# Patient Record
Sex: FEMALE | Race: OTHER | HISPANIC OR LATINO
[De-identification: names, ages, dates, MRNs, and addresses within clinical notes are randomized per-mention and may not be internally consistent; named-entity substitution may affect disease eponyms.]

---

## 2022-11-07 ENCOUNTER — NON-APPOINTMENT (OUTPATIENT)
Age: 27
End: 2022-11-07

## 2022-11-07 ENCOUNTER — APPOINTMENT (OUTPATIENT)
Dept: OBGYN | Facility: CLINIC | Age: 27
End: 2022-11-07

## 2022-11-07 VITALS
HEIGHT: 64 IN | BODY MASS INDEX: 33.29 KG/M2 | WEIGHT: 195 LBS | SYSTOLIC BLOOD PRESSURE: 146 MMHG | DIASTOLIC BLOOD PRESSURE: 85 MMHG | OXYGEN SATURATION: 98 %

## 2022-11-07 DIAGNOSIS — Z83.79 FAMILY HISTORY OF OTHER DISEASES OF THE DIGESTIVE SYSTEM: ICD-10-CM

## 2022-11-07 DIAGNOSIS — Z78.9 OTHER SPECIFIED HEALTH STATUS: ICD-10-CM

## 2022-11-07 DIAGNOSIS — E73.9 LACTOSE INTOLERANCE, UNSPECIFIED: ICD-10-CM

## 2022-11-07 DIAGNOSIS — Z87.19 PERSONAL HISTORY OF OTHER DISEASES OF THE DIGESTIVE SYSTEM: ICD-10-CM

## 2022-11-07 DIAGNOSIS — Z83.3 FAMILY HISTORY OF DIABETES MELLITUS: ICD-10-CM

## 2022-11-07 PROBLEM — Z00.00 ENCOUNTER FOR PREVENTIVE HEALTH EXAMINATION: Status: ACTIVE | Noted: 2022-11-07

## 2022-11-07 PROCEDURE — 76817 TRANSVAGINAL US OBSTETRIC: CPT

## 2022-11-07 PROCEDURE — 99204 OFFICE O/P NEW MOD 45 MIN: CPT | Mod: 25

## 2022-11-07 NOTE — PROCEDURE
[Transvaginal OB Sonogram] : Transvaginal OB Sonogram [Intrauterine Pregnancy] : intrauterine pregnancy [Yolk Sac] : yolk sac present [Fetal Heart] : no fetal heart [Transvaginal OB Sonogram WNL] : Transvaginal OB Sonogram WNL [FreeTextEntry1] : early iup at approx 5 weeks; + GS + YS; no fp just yet.\par

## 2022-11-07 NOTE — HISTORY OF PRESENT ILLNESS
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

## 2022-11-07 NOTE — PLAN
[FreeTextEntry1] : Review of prenatal care\par group practice reviewed\par screening genetic tests (option of basic genetic screen vs Horizon) will discuss at nv\par fetal screening reviewed (options of First screen only or add NIPTs) will discuss at nv\par questions answered\par emergency contact reviewed\par rtn 2 weeks for confirmtaion

## 2022-11-21 ENCOUNTER — APPOINTMENT (OUTPATIENT)
Dept: OBGYN | Facility: CLINIC | Age: 27
End: 2022-11-21

## 2022-11-21 ENCOUNTER — LABORATORY RESULT (OUTPATIENT)
Age: 27
End: 2022-11-21

## 2022-11-21 ENCOUNTER — TRANSCRIPTION ENCOUNTER (OUTPATIENT)
Age: 27
End: 2022-11-21

## 2022-11-21 VITALS
DIASTOLIC BLOOD PRESSURE: 63 MMHG | OXYGEN SATURATION: 98 % | HEART RATE: 83 BPM | SYSTOLIC BLOOD PRESSURE: 119 MMHG | BODY MASS INDEX: 33.82 KG/M2 | WEIGHT: 197 LBS

## 2022-11-21 DIAGNOSIS — N91.0 PRIMARY AMENORRHEA: ICD-10-CM

## 2022-11-21 DIAGNOSIS — Z13.79 ENCOUNTER FOR OTHER SCREENING FOR GENETIC AND CHROMOSOMAL ANOMALIES: ICD-10-CM

## 2022-11-21 DIAGNOSIS — Z11.3 ENCOUNTER FOR SCREENING FOR INFECTIONS WITH A PREDOMINANTLY SEXUAL MODE OF TRANSMISSION: ICD-10-CM

## 2022-11-21 PROCEDURE — 0502F SUBSEQUENT PRENATAL CARE: CPT

## 2022-11-28 ENCOUNTER — TRANSCRIPTION ENCOUNTER (OUTPATIENT)
Age: 27
End: 2022-11-28

## 2022-11-29 ENCOUNTER — TRANSCRIPTION ENCOUNTER (OUTPATIENT)
Age: 27
End: 2022-11-29

## 2022-11-30 LAB
ABO + RH PNL BLD: NORMAL
AR GENE MUT ANL BLD/T: NORMAL
BACTERIA UR CULT: NORMAL
BASOPHILS # BLD AUTO: 0.03 K/UL
BASOPHILS NFR BLD AUTO: 0.3 %
BLD GP AB SCN SERPL QL: NORMAL
C TRACH RRNA SPEC QL NAA+PROBE: NOT DETECTED
CMV IGG SERPL QL: 0.5 U/ML
CMV IGG SERPL-IMP: NEGATIVE
CMV IGM SERPL QL: <8 AU/ML
CMV IGM SERPL QL: NEGATIVE
EOSINOPHIL # BLD AUTO: 0.08 K/UL
EOSINOPHIL NFR BLD AUTO: 0.8 %
ESTIMATED AVERAGE GLUCOSE: 117 MG/DL
FMR1 GENE MUT ANL BLD/T: NORMAL
HBA1C MFR BLD HPLC: 5.7 %
HBV SURFACE AG SER QL: NONREACTIVE
HCT VFR BLD CALC: 37.4 %
HCV AB SER QL: NONREACTIVE
HCV RNA SERPL NAA+PROBE-LOG IU: NOT DETECTED LOGIU/ML
HCV S/CO RATIO: 0.12 S/CO
HEPC RNA INTERP: NOT DETECTED
HGB A MFR BLD: 97.7 %
HGB A2 MFR BLD: 2.3 %
HGB BLD-MCNC: 12.1 G/DL
HGB FRACT BLD-IMP: NORMAL
HIV1+2 AB SPEC QL IA.RAPID: NONREACTIVE
IMM GRANULOCYTES NFR BLD AUTO: 0.3 %
LYMPHOCYTES # BLD AUTO: 1.89 K/UL
LYMPHOCYTES NFR BLD AUTO: 17.8 %
MAN DIFF?: NORMAL
MCHC RBC-ENTMCNC: 28.9 PG
MCHC RBC-ENTMCNC: 32.4 GM/DL
MCV RBC AUTO: 89.5 FL
MEV IGG FLD QL IA: 149 AU/ML
MEV IGG+IGM SER-IMP: POSITIVE
MONOCYTES # BLD AUTO: 0.8 K/UL
MONOCYTES NFR BLD AUTO: 7.5 %
N GONORRHOEA RRNA SPEC QL NAA+PROBE: NOT DETECTED
NEUTROPHILS # BLD AUTO: 7.79 K/UL
NEUTROPHILS NFR BLD AUTO: 73.3 %
PLATELET # BLD AUTO: 287 K/UL
RBC # BLD: 4.18 M/UL
RBC # FLD: 12.6 %
RUBV IGG FLD-ACNC: 2 INDEX
RUBV IGG SER-IMP: POSITIVE
SOURCE AMPLIFICATION: NORMAL
T PALLIDUM AB SER QL IA: NEGATIVE
TSH SERPL-ACNC: 0.7 UIU/ML
VZV AB TITR SER: POSITIVE
VZV IGG SER IF-ACNC: 869.9 INDEX
WBC # FLD AUTO: 10.62 K/UL

## 2022-12-02 LAB — CFTR MUT TESTED BLD/T: NEGATIVE

## 2022-12-14 ENCOUNTER — NON-APPOINTMENT (OUTPATIENT)
Age: 27
End: 2022-12-14

## 2022-12-15 ENCOUNTER — APPOINTMENT (OUTPATIENT)
Dept: OBGYN | Facility: CLINIC | Age: 27
End: 2022-12-15

## 2022-12-15 VITALS
BODY MASS INDEX: 32.78 KG/M2 | HEIGHT: 64 IN | OXYGEN SATURATION: 98 % | WEIGHT: 192 LBS | DIASTOLIC BLOOD PRESSURE: 84 MMHG | SYSTOLIC BLOOD PRESSURE: 130 MMHG

## 2022-12-15 DIAGNOSIS — Z23 ENCOUNTER FOR IMMUNIZATION: ICD-10-CM

## 2022-12-15 PROCEDURE — G0008: CPT

## 2022-12-15 PROCEDURE — 90686 IIV4 VACC NO PRSV 0.5 ML IM: CPT

## 2022-12-15 PROCEDURE — 0502F SUBSEQUENT PRENATAL CARE: CPT

## 2022-12-27 ENCOUNTER — ASOB RESULT (OUTPATIENT)
Age: 27
End: 2022-12-27

## 2022-12-27 ENCOUNTER — APPOINTMENT (OUTPATIENT)
Dept: ANTEPARTUM | Facility: CLINIC | Age: 27
End: 2022-12-27

## 2022-12-27 PROCEDURE — 76801 OB US < 14 WKS SINGLE FETUS: CPT

## 2022-12-27 PROCEDURE — 36415 COLL VENOUS BLD VENIPUNCTURE: CPT

## 2022-12-27 PROCEDURE — 93976 VASCULAR STUDY: CPT

## 2022-12-27 PROCEDURE — 76813 OB US NUCHAL MEAS 1 GEST: CPT

## 2022-12-29 ENCOUNTER — TRANSCRIPTION ENCOUNTER (OUTPATIENT)
Age: 27
End: 2022-12-29

## 2023-01-02 ENCOUNTER — TRANSCRIPTION ENCOUNTER (OUTPATIENT)
Age: 28
End: 2023-01-02

## 2023-01-06 ENCOUNTER — TRANSCRIPTION ENCOUNTER (OUTPATIENT)
Age: 28
End: 2023-01-06

## 2023-01-06 ENCOUNTER — NON-APPOINTMENT (OUTPATIENT)
Age: 28
End: 2023-01-06

## 2023-01-20 ENCOUNTER — APPOINTMENT (OUTPATIENT)
Dept: OBGYN | Facility: CLINIC | Age: 28
End: 2023-01-20
Payer: COMMERCIAL

## 2023-01-20 VITALS
HEIGHT: 64 IN | OXYGEN SATURATION: 98 % | WEIGHT: 189 LBS | HEART RATE: 78 BPM | BODY MASS INDEX: 32.27 KG/M2 | SYSTOLIC BLOOD PRESSURE: 126 MMHG | DIASTOLIC BLOOD PRESSURE: 80 MMHG

## 2023-01-20 DIAGNOSIS — Z34.90 ENCOUNTER FOR SUPERVISION OF NORMAL PREGNANCY, UNSPECIFIED, UNSPECIFIED TRIMESTER: ICD-10-CM

## 2023-01-20 PROCEDURE — 0502F SUBSEQUENT PRENATAL CARE: CPT

## 2023-01-20 RX ORDER — ASPIRIN 81 MG/1
81 TABLET, CHEWABLE ORAL
Refills: 0 | Status: ACTIVE | COMMUNITY
Start: 2023-01-20

## 2023-02-17 ENCOUNTER — APPOINTMENT (OUTPATIENT)
Dept: ANTEPARTUM | Facility: CLINIC | Age: 28
End: 2023-02-17
Payer: COMMERCIAL

## 2023-02-17 ENCOUNTER — ASOB RESULT (OUTPATIENT)
Age: 28
End: 2023-02-17

## 2023-02-17 PROCEDURE — 76817 TRANSVAGINAL US OBSTETRIC: CPT

## 2023-02-17 PROCEDURE — 76811 OB US DETAILED SNGL FETUS: CPT

## 2023-02-22 ENCOUNTER — NON-APPOINTMENT (OUTPATIENT)
Age: 28
End: 2023-02-22

## 2023-02-22 LAB
AFP MOM: 0.72
AFP VALUE: 21.7 NG/ML
ALPHA FETOPROTEIN SERUM COMMENT: NORMAL
ALPHA FETOPROTEIN SERUM INTERPRETATION: NORMAL
ALPHA FETOPROTEIN SERUM RESULTS: NORMAL
ALPHA FETOPROTEIN SERUM TEST RESULTS: NORMAL
GESTATIONAL AGE BASED ON: NORMAL
GESTATIONAL AGE ON COLLECTION DATE: 16 WEEKS
GLUCOSE 1H P 50 G GLC PO SERPL-MCNC: 116 MG/DL
INSULIN DEP DIABETES: NO
MATERNAL AGE AT EDD AFP: 29.6 YR
MULTIPLE GESTATION: NO
OSBR RISK 1 IN: NORMAL
RACE: NORMAL
WEIGHT AFP: 189 LBS

## 2023-02-23 ENCOUNTER — APPOINTMENT (OUTPATIENT)
Dept: OBGYN | Facility: CLINIC | Age: 28
End: 2023-02-23
Payer: COMMERCIAL

## 2023-02-23 VITALS
DIASTOLIC BLOOD PRESSURE: 82 MMHG | SYSTOLIC BLOOD PRESSURE: 124 MMHG | WEIGHT: 187 LBS | OXYGEN SATURATION: 98 % | HEART RATE: 82 BPM | HEIGHT: 64 IN | BODY MASS INDEX: 31.92 KG/M2

## 2023-02-23 PROCEDURE — 0502F SUBSEQUENT PRENATAL CARE: CPT

## 2023-03-03 ENCOUNTER — APPOINTMENT (OUTPATIENT)
Dept: ANTEPARTUM | Facility: CLINIC | Age: 28
End: 2023-03-03
Payer: COMMERCIAL

## 2023-03-03 ENCOUNTER — ASOB RESULT (OUTPATIENT)
Age: 28
End: 2023-03-03

## 2023-03-03 PROCEDURE — 76816 OB US FOLLOW-UP PER FETUS: CPT

## 2023-03-17 ENCOUNTER — APPOINTMENT (OUTPATIENT)
Dept: PEDIATRIC CARDIOLOGY | Facility: CLINIC | Age: 28
End: 2023-03-17
Payer: COMMERCIAL

## 2023-03-17 PROCEDURE — 76827 ECHO EXAM OF FETAL HEART: CPT

## 2023-03-17 PROCEDURE — 99203 OFFICE O/P NEW LOW 30 MIN: CPT | Mod: 25

## 2023-03-17 PROCEDURE — 93325 DOPPLER ECHO COLOR FLOW MAPG: CPT | Mod: 59

## 2023-03-17 PROCEDURE — 76825 ECHO EXAM OF FETAL HEART: CPT

## 2023-03-17 PROCEDURE — 76820 UMBILICAL ARTERY ECHO: CPT

## 2023-03-17 PROCEDURE — 76821 MIDDLE CEREBRAL ARTERY ECHO: CPT

## 2023-03-20 ENCOUNTER — NON-APPOINTMENT (OUTPATIENT)
Age: 28
End: 2023-03-20

## 2023-03-20 ENCOUNTER — APPOINTMENT (OUTPATIENT)
Dept: OBGYN | Facility: CLINIC | Age: 28
End: 2023-03-20
Payer: COMMERCIAL

## 2023-03-20 VITALS
OXYGEN SATURATION: 97 % | DIASTOLIC BLOOD PRESSURE: 81 MMHG | SYSTOLIC BLOOD PRESSURE: 113 MMHG | BODY MASS INDEX: 32.79 KG/M2 | WEIGHT: 191 LBS | HEART RATE: 104 BPM

## 2023-03-20 PROCEDURE — 0502F SUBSEQUENT PRENATAL CARE: CPT

## 2023-03-21 ENCOUNTER — TRANSCRIPTION ENCOUNTER (OUTPATIENT)
Age: 28
End: 2023-03-21

## 2023-03-21 LAB
BASOPHILS # BLD AUTO: 0.03 K/UL
BASOPHILS NFR BLD AUTO: 0.4 %
EOSINOPHIL # BLD AUTO: 0.06 K/UL
EOSINOPHIL NFR BLD AUTO: 0.8 %
GLUCOSE 1H P 50 G GLC PO SERPL-MCNC: 149 MG/DL
HCT VFR BLD CALC: 34.9 %
HGB BLD-MCNC: 11.4 G/DL
IMM GRANULOCYTES NFR BLD AUTO: 0.5 %
LYMPHOCYTES # BLD AUTO: 0.96 K/UL
LYMPHOCYTES NFR BLD AUTO: 12.5 %
MAN DIFF?: NORMAL
MCHC RBC-ENTMCNC: 28.8 PG
MCHC RBC-ENTMCNC: 32.7 GM/DL
MCV RBC AUTO: 88.1 FL
MONOCYTES # BLD AUTO: 0.63 K/UL
MONOCYTES NFR BLD AUTO: 8.2 %
NEUTROPHILS # BLD AUTO: 5.98 K/UL
NEUTROPHILS NFR BLD AUTO: 77.6 %
PLATELET # BLD AUTO: 231 K/UL
RBC # BLD: 3.96 M/UL
RBC # FLD: 12.9 %
T PALLIDUM AB SER QL IA: NEGATIVE
WBC # FLD AUTO: 7.7 K/UL

## 2023-03-27 ENCOUNTER — APPOINTMENT (OUTPATIENT)
Dept: OBGYN | Facility: CLINIC | Age: 28
End: 2023-03-27
Payer: COMMERCIAL

## 2023-03-27 DIAGNOSIS — O99.810 ABNORMAL GLUCOSE COMPLICATING PREGNANCY: ICD-10-CM

## 2023-03-27 PROCEDURE — 36415 COLL VENOUS BLD VENIPUNCTURE: CPT

## 2023-04-05 ENCOUNTER — TRANSCRIPTION ENCOUNTER (OUTPATIENT)
Age: 28
End: 2023-04-05

## 2023-04-10 ENCOUNTER — APPOINTMENT (OUTPATIENT)
Dept: OBGYN | Facility: CLINIC | Age: 28
End: 2023-04-10
Payer: COMMERCIAL

## 2023-04-10 VITALS
HEART RATE: 69 BPM | DIASTOLIC BLOOD PRESSURE: 79 MMHG | WEIGHT: 193 LBS | BODY MASS INDEX: 32.95 KG/M2 | SYSTOLIC BLOOD PRESSURE: 122 MMHG | HEIGHT: 64 IN | OXYGEN SATURATION: 98 %

## 2023-04-10 PROCEDURE — 90471 IMMUNIZATION ADMIN: CPT

## 2023-04-10 PROCEDURE — 0502F SUBSEQUENT PRENATAL CARE: CPT

## 2023-04-10 PROCEDURE — 90715 TDAP VACCINE 7 YRS/> IM: CPT

## 2023-04-24 ENCOUNTER — APPOINTMENT (OUTPATIENT)
Dept: ANTEPARTUM | Facility: CLINIC | Age: 28
End: 2023-04-24
Payer: COMMERCIAL

## 2023-04-24 ENCOUNTER — ASOB RESULT (OUTPATIENT)
Age: 28
End: 2023-04-24

## 2023-04-24 PROCEDURE — 76819 FETAL BIOPHYS PROFIL W/O NST: CPT

## 2023-04-24 PROCEDURE — 76816 OB US FOLLOW-UP PER FETUS: CPT

## 2023-05-03 ENCOUNTER — NON-APPOINTMENT (OUTPATIENT)
Age: 28
End: 2023-05-03

## 2023-05-03 ENCOUNTER — APPOINTMENT (OUTPATIENT)
Dept: OBGYN | Facility: CLINIC | Age: 28
End: 2023-05-03
Payer: COMMERCIAL

## 2023-05-03 VITALS
WEIGHT: 196 LBS | OXYGEN SATURATION: 97 % | DIASTOLIC BLOOD PRESSURE: 78 MMHG | SYSTOLIC BLOOD PRESSURE: 113 MMHG | HEART RATE: 92 BPM | BODY MASS INDEX: 33.64 KG/M2

## 2023-05-03 PROCEDURE — 0502F SUBSEQUENT PRENATAL CARE: CPT

## 2023-05-17 ENCOUNTER — APPOINTMENT (OUTPATIENT)
Dept: OBGYN | Facility: CLINIC | Age: 28
End: 2023-05-17
Payer: COMMERCIAL

## 2023-05-17 VITALS
HEIGHT: 64 IN | HEART RATE: 74 BPM | BODY MASS INDEX: 33.63 KG/M2 | WEIGHT: 197 LBS | OXYGEN SATURATION: 97 % | DIASTOLIC BLOOD PRESSURE: 77 MMHG | SYSTOLIC BLOOD PRESSURE: 114 MMHG

## 2023-05-17 PROCEDURE — 0502F SUBSEQUENT PRENATAL CARE: CPT

## 2023-05-31 ENCOUNTER — APPOINTMENT (OUTPATIENT)
Dept: OBGYN | Facility: CLINIC | Age: 28
End: 2023-05-31
Payer: COMMERCIAL

## 2023-05-31 VITALS
BODY MASS INDEX: 34.49 KG/M2 | SYSTOLIC BLOOD PRESSURE: 117 MMHG | OXYGEN SATURATION: 98 % | DIASTOLIC BLOOD PRESSURE: 76 MMHG | HEART RATE: 72 BPM | WEIGHT: 202 LBS | HEIGHT: 64 IN

## 2023-05-31 PROCEDURE — 0502F SUBSEQUENT PRENATAL CARE: CPT

## 2023-06-05 ENCOUNTER — APPOINTMENT (OUTPATIENT)
Dept: ANTEPARTUM | Facility: CLINIC | Age: 28
End: 2023-06-05
Payer: COMMERCIAL

## 2023-06-05 ENCOUNTER — ASOB RESULT (OUTPATIENT)
Age: 28
End: 2023-06-05

## 2023-06-05 PROCEDURE — 76816 OB US FOLLOW-UP PER FETUS: CPT

## 2023-06-05 PROCEDURE — 76819 FETAL BIOPHYS PROFIL W/O NST: CPT

## 2023-06-14 ENCOUNTER — APPOINTMENT (OUTPATIENT)
Dept: OBGYN | Facility: CLINIC | Age: 28
End: 2023-06-14
Payer: COMMERCIAL

## 2023-06-14 ENCOUNTER — LABORATORY RESULT (OUTPATIENT)
Age: 28
End: 2023-06-14

## 2023-06-14 VITALS
OXYGEN SATURATION: 97 % | DIASTOLIC BLOOD PRESSURE: 84 MMHG | HEART RATE: 70 BPM | SYSTOLIC BLOOD PRESSURE: 126 MMHG | WEIGHT: 203 LBS | BODY MASS INDEX: 34.85 KG/M2

## 2023-06-14 PROCEDURE — 0502F SUBSEQUENT PRENATAL CARE: CPT

## 2023-06-20 ENCOUNTER — APPOINTMENT (OUTPATIENT)
Dept: OBGYN | Facility: CLINIC | Age: 28
End: 2023-06-20
Payer: COMMERCIAL

## 2023-06-20 VITALS
BODY MASS INDEX: 34.66 KG/M2 | OXYGEN SATURATION: 97 % | HEIGHT: 64 IN | SYSTOLIC BLOOD PRESSURE: 132 MMHG | DIASTOLIC BLOOD PRESSURE: 88 MMHG | HEART RATE: 79 BPM | WEIGHT: 203 LBS

## 2023-06-20 VITALS — SYSTOLIC BLOOD PRESSURE: 122 MMHG | DIASTOLIC BLOOD PRESSURE: 84 MMHG

## 2023-06-20 PROCEDURE — 0502F SUBSEQUENT PRENATAL CARE: CPT

## 2023-06-21 ENCOUNTER — APPOINTMENT (OUTPATIENT)
Dept: ANTEPARTUM | Facility: CLINIC | Age: 28
End: 2023-06-21
Payer: COMMERCIAL

## 2023-06-21 ENCOUNTER — ASOB RESULT (OUTPATIENT)
Age: 28
End: 2023-06-21

## 2023-06-21 PROCEDURE — 76819 FETAL BIOPHYS PROFIL W/O NST: CPT

## 2023-06-21 PROCEDURE — 76816 OB US FOLLOW-UP PER FETUS: CPT

## 2023-06-27 LAB
B-HEM STREP SPEC QL CULT: NORMAL
HBV SURFACE AG SER QL: NONREACTIVE
HIV1+2 AB SPEC QL IA.RAPID: NONREACTIVE

## 2023-06-28 ENCOUNTER — APPOINTMENT (OUTPATIENT)
Dept: OBGYN | Facility: CLINIC | Age: 28
End: 2023-06-28
Payer: COMMERCIAL

## 2023-06-28 ENCOUNTER — INPATIENT (INPATIENT)
Facility: HOSPITAL | Age: 28
LOS: 2 days | Discharge: HOME | End: 2023-07-01
Attending: OBSTETRICS & GYNECOLOGY | Admitting: OBSTETRICS & GYNECOLOGY
Payer: COMMERCIAL

## 2023-06-28 VITALS — WEIGHT: 202.83 LBS | HEIGHT: 64 IN

## 2023-06-28 VITALS
DIASTOLIC BLOOD PRESSURE: 90 MMHG | HEIGHT: 64 IN | BODY MASS INDEX: 34.49 KG/M2 | SYSTOLIC BLOOD PRESSURE: 137 MMHG | OXYGEN SATURATION: 97 % | WEIGHT: 202 LBS | HEART RATE: 76 BPM

## 2023-06-28 DIAGNOSIS — Z3A.00 WEEKS OF GESTATION OF PREGNANCY NOT SPECIFIED: ICD-10-CM

## 2023-06-28 DIAGNOSIS — O26.899 OTHER SPECIFIED PREGNANCY RELATED CONDITIONS, UNSPECIFIED TRIMESTER: ICD-10-CM

## 2023-06-28 LAB
ALBUMIN SERPL ELPH-MCNC: 3.5 G/DL — SIGNIFICANT CHANGE UP (ref 3.3–5)
ALP SERPL-CCNC: 178 U/L — HIGH (ref 40–120)
ALT FLD-CCNC: 11 U/L — SIGNIFICANT CHANGE UP (ref 10–45)
ANION GAP SERPL CALC-SCNC: 11 MMOL/L — SIGNIFICANT CHANGE UP (ref 5–17)
APPEARANCE UR: CLEAR — SIGNIFICANT CHANGE UP
APTT BLD: 31.4 SEC — SIGNIFICANT CHANGE UP (ref 27.5–35.5)
AST SERPL-CCNC: 25 U/L — SIGNIFICANT CHANGE UP (ref 10–40)
BACTERIA # UR AUTO: PRESENT /HPF
BASOPHILS # BLD AUTO: 0.02 K/UL — SIGNIFICANT CHANGE UP (ref 0–0.2)
BASOPHILS NFR BLD AUTO: 0.2 % — SIGNIFICANT CHANGE UP (ref 0–2)
BILIRUB SERPL-MCNC: <0.2 MG/DL — SIGNIFICANT CHANGE UP (ref 0.2–1.2)
BILIRUB UR-MCNC: NEGATIVE — SIGNIFICANT CHANGE UP
BLD GP AB SCN SERPL QL: NEGATIVE — SIGNIFICANT CHANGE UP
BUN SERPL-MCNC: 8 MG/DL — SIGNIFICANT CHANGE UP (ref 7–23)
CALCIUM SERPL-MCNC: 9.5 MG/DL — SIGNIFICANT CHANGE UP (ref 8.4–10.5)
CHLORIDE SERPL-SCNC: 104 MMOL/L — SIGNIFICANT CHANGE UP (ref 96–108)
CO2 SERPL-SCNC: 21 MMOL/L — LOW (ref 22–31)
COLOR SPEC: YELLOW — SIGNIFICANT CHANGE UP
CREAT ?TM UR-MCNC: 32 MG/DL — SIGNIFICANT CHANGE UP
CREAT SERPL-MCNC: 0.51 MG/DL — SIGNIFICANT CHANGE UP (ref 0.5–1.3)
DIFF PNL FLD: NEGATIVE — SIGNIFICANT CHANGE UP
EGFR: 130 ML/MIN/1.73M2 — SIGNIFICANT CHANGE UP
EOSINOPHIL # BLD AUTO: 0.05 K/UL — SIGNIFICANT CHANGE UP (ref 0–0.5)
EOSINOPHIL NFR BLD AUTO: 0.6 % — SIGNIFICANT CHANGE UP (ref 0–6)
EPI CELLS # UR: SIGNIFICANT CHANGE UP /HPF (ref 0–5)
FIBRINOGEN PPP-MCNC: 559 MG/DL — HIGH (ref 200–445)
GLUCOSE SERPL-MCNC: 85 MG/DL — SIGNIFICANT CHANGE UP (ref 70–99)
GLUCOSE UR QL: NEGATIVE — SIGNIFICANT CHANGE UP
HCT VFR BLD CALC: 37.3 % — SIGNIFICANT CHANGE UP (ref 34.5–45)
HGB BLD-MCNC: 12.5 G/DL — SIGNIFICANT CHANGE UP (ref 11.5–15.5)
IMM GRANULOCYTES NFR BLD AUTO: 0.5 % — SIGNIFICANT CHANGE UP (ref 0–0.9)
INR BLD: 0.82 — LOW (ref 0.88–1.16)
KETONES UR-MCNC: NEGATIVE — SIGNIFICANT CHANGE UP
LDH SERPL L TO P-CCNC: 168 U/L — SIGNIFICANT CHANGE UP (ref 50–242)
LEUKOCYTE ESTERASE UR-ACNC: ABNORMAL
LYMPHOCYTES # BLD AUTO: 1.42 K/UL — SIGNIFICANT CHANGE UP (ref 1–3.3)
LYMPHOCYTES # BLD AUTO: 16 % — SIGNIFICANT CHANGE UP (ref 13–44)
MCHC RBC-ENTMCNC: 28.9 PG — SIGNIFICANT CHANGE UP (ref 27–34)
MCHC RBC-ENTMCNC: 33.5 GM/DL — SIGNIFICANT CHANGE UP (ref 32–36)
MCV RBC AUTO: 86.3 FL — SIGNIFICANT CHANGE UP (ref 80–100)
MONOCYTES # BLD AUTO: 0.55 K/UL — SIGNIFICANT CHANGE UP (ref 0–0.9)
MONOCYTES NFR BLD AUTO: 6.2 % — SIGNIFICANT CHANGE UP (ref 2–14)
NEUTROPHILS # BLD AUTO: 6.77 K/UL — SIGNIFICANT CHANGE UP (ref 1.8–7.4)
NEUTROPHILS NFR BLD AUTO: 76.5 % — SIGNIFICANT CHANGE UP (ref 43–77)
NITRITE UR-MCNC: NEGATIVE — SIGNIFICANT CHANGE UP
NRBC # BLD: 0 /100 WBCS — SIGNIFICANT CHANGE UP (ref 0–0)
PH UR: 6 — SIGNIFICANT CHANGE UP (ref 5–8)
PLATELET # BLD AUTO: 181 K/UL — SIGNIFICANT CHANGE UP (ref 150–400)
POTASSIUM SERPL-MCNC: 4.1 MMOL/L — SIGNIFICANT CHANGE UP (ref 3.5–5.3)
POTASSIUM SERPL-SCNC: 4.1 MMOL/L — SIGNIFICANT CHANGE UP (ref 3.5–5.3)
PROT ?TM UR-MCNC: 5 MG/DL — SIGNIFICANT CHANGE UP (ref 0–12)
PROT SERPL-MCNC: 6.7 G/DL — SIGNIFICANT CHANGE UP (ref 6–8.3)
PROT UR-MCNC: NEGATIVE MG/DL — SIGNIFICANT CHANGE UP
PROT/CREAT UR-RTO: 0.2 RATIO — SIGNIFICANT CHANGE UP (ref 0–0.2)
PROTHROM AB SERPL-ACNC: 9.7 SEC — LOW (ref 10.5–13.4)
RBC # BLD: 4.32 M/UL — SIGNIFICANT CHANGE UP (ref 3.8–5.2)
RBC # FLD: 13.9 % — SIGNIFICANT CHANGE UP (ref 10.3–14.5)
RBC CASTS # UR COMP ASSIST: < 5 /HPF — SIGNIFICANT CHANGE UP
RH IG SCN BLD-IMP: POSITIVE — SIGNIFICANT CHANGE UP
RH IG SCN BLD-IMP: POSITIVE — SIGNIFICANT CHANGE UP
SODIUM SERPL-SCNC: 136 MMOL/L — SIGNIFICANT CHANGE UP (ref 135–145)
SP GR SPEC: <=1.005 — SIGNIFICANT CHANGE UP (ref 1–1.03)
URATE SERPL-MCNC: 4 MG/DL — SIGNIFICANT CHANGE UP (ref 2.5–7)
UROBILINOGEN FLD QL: 0.2 E.U./DL — SIGNIFICANT CHANGE UP
WBC # BLD: 8.85 K/UL — SIGNIFICANT CHANGE UP (ref 3.8–10.5)
WBC # FLD AUTO: 8.85 K/UL — SIGNIFICANT CHANGE UP (ref 3.8–10.5)
WBC UR QL: ABNORMAL /HPF

## 2023-06-28 PROCEDURE — 0502F SUBSEQUENT PRENATAL CARE: CPT

## 2023-06-28 PROCEDURE — 59400 OBSTETRICAL CARE: CPT

## 2023-06-28 RX ORDER — CHLORHEXIDINE GLUCONATE 213 G/1000ML
1 SOLUTION TOPICAL ONCE
Refills: 0 | Status: COMPLETED | OUTPATIENT
Start: 2023-06-28 | End: 2023-06-28

## 2023-06-28 RX ORDER — SODIUM CHLORIDE 9 MG/ML
1000 INJECTION, SOLUTION INTRAVENOUS
Refills: 0 | Status: DISCONTINUED | OUTPATIENT
Start: 2023-06-28 | End: 2023-06-29

## 2023-06-28 RX ORDER — OXYTOCIN 10 UNIT/ML
333.33 VIAL (ML) INJECTION
Qty: 20 | Refills: 0 | Status: DISCONTINUED | OUTPATIENT
Start: 2023-06-28 | End: 2023-06-29

## 2023-06-28 RX ORDER — CITRIC ACID/SODIUM CITRATE 300-500 MG
15 SOLUTION, ORAL ORAL ONCE
Refills: 0 | Status: COMPLETED | OUTPATIENT
Start: 2023-06-28 | End: 2023-06-29

## 2023-06-28 RX ORDER — OXYTOCIN 10 UNIT/ML
1 VIAL (ML) INJECTION
Qty: 30 | Refills: 0 | Status: DISCONTINUED | OUTPATIENT
Start: 2023-06-28 | End: 2023-07-01

## 2023-06-28 RX ADMIN — Medication 1 MILLIUNIT(S)/MIN: at 20:33

## 2023-06-28 RX ADMIN — SODIUM CHLORIDE 125 MILLILITER(S): 9 INJECTION, SOLUTION INTRAVENOUS at 21:20

## 2023-06-28 RX ADMIN — SODIUM CHLORIDE 125 MILLILITER(S): 9 INJECTION, SOLUTION INTRAVENOUS at 16:01

## 2023-06-28 NOTE — PATIENT PROFILE OB - NSPRENATALGBS_OBGYN_ALL_OB_GET_DAYS
Unable to assess due to patient's baseline confusion secondary to dementia Unable to assess due to patient's baseline confusion secondary to underlying dementia and concurrent somnolence 14

## 2023-06-28 NOTE — PATIENT PROFILE OB - FALL HARM RISK - UNIVERSAL INTERVENTIONS
Bed in lowest position, wheels locked, appropriate side rails in place/Call bell, personal items and telephone in reach/Instruct patient to call for assistance before getting out of bed or chair/Non-slip footwear when patient is out of bed/[place] to call system/Physically safe environment - no spills, clutter or unnecessary equipment/Purposeful Proactive Rounding/Room/bathroom lighting operational, light cord in reach

## 2023-06-29 LAB
COVID-19 SPIKE DOMAIN AB INTERP: POSITIVE
COVID-19 SPIKE DOMAIN ANTIBODY RESULT: >250 U/ML — HIGH
SARS-COV-2 IGG+IGM SERPL QL IA: >250 U/ML — HIGH
SARS-COV-2 IGG+IGM SERPL QL IA: POSITIVE
T PALLIDUM AB TITR SER: NEGATIVE — SIGNIFICANT CHANGE UP

## 2023-06-29 PROCEDURE — 59400 OBSTETRICAL CARE: CPT

## 2023-06-29 RX ORDER — SIMETHICONE 80 MG/1
80 TABLET, CHEWABLE ORAL EVERY 4 HOURS
Refills: 0 | Status: DISCONTINUED | OUTPATIENT
Start: 2023-06-29 | End: 2023-07-01

## 2023-06-29 RX ORDER — TETANUS TOXOID, REDUCED DIPHTHERIA TOXOID AND ACELLULAR PERTUSSIS VACCINE, ADSORBED 5; 2.5; 8; 8; 2.5 [IU]/.5ML; [IU]/.5ML; UG/.5ML; UG/.5ML; UG/.5ML
0.5 SUSPENSION INTRAMUSCULAR ONCE
Refills: 0 | Status: DISCONTINUED | OUTPATIENT
Start: 2023-06-29 | End: 2023-07-01

## 2023-06-29 RX ORDER — OXYTOCIN 10 UNIT/ML
41.67 VIAL (ML) INJECTION
Qty: 20 | Refills: 0 | Status: DISCONTINUED | OUTPATIENT
Start: 2023-06-29 | End: 2023-07-01

## 2023-06-29 RX ORDER — OXYCODONE HYDROCHLORIDE 5 MG/1
5 TABLET ORAL
Refills: 0 | Status: DISCONTINUED | OUTPATIENT
Start: 2023-06-29 | End: 2023-07-01

## 2023-06-29 RX ORDER — DIBUCAINE 1 %
1 OINTMENT (GRAM) RECTAL EVERY 6 HOURS
Refills: 0 | Status: DISCONTINUED | OUTPATIENT
Start: 2023-06-29 | End: 2023-07-01

## 2023-06-29 RX ORDER — BENZOCAINE 10 %
1 GEL (GRAM) MUCOUS MEMBRANE EVERY 6 HOURS
Refills: 0 | Status: DISCONTINUED | OUTPATIENT
Start: 2023-06-29 | End: 2023-07-01

## 2023-06-29 RX ORDER — IBUPROFEN 200 MG
600 TABLET ORAL EVERY 6 HOURS
Refills: 0 | Status: COMPLETED | OUTPATIENT
Start: 2023-06-29 | End: 2024-05-27

## 2023-06-29 RX ORDER — LANOLIN
1 OINTMENT (GRAM) TOPICAL EVERY 6 HOURS
Refills: 0 | Status: DISCONTINUED | OUTPATIENT
Start: 2023-06-29 | End: 2023-07-01

## 2023-06-29 RX ORDER — DIPHENHYDRAMINE HCL 50 MG
25 CAPSULE ORAL EVERY 6 HOURS
Refills: 0 | Status: DISCONTINUED | OUTPATIENT
Start: 2023-06-29 | End: 2023-07-01

## 2023-06-29 RX ORDER — ACETAMINOPHEN 500 MG
975 TABLET ORAL
Refills: 0 | Status: DISCONTINUED | OUTPATIENT
Start: 2023-06-29 | End: 2023-07-01

## 2023-06-29 RX ORDER — SODIUM CHLORIDE 9 MG/ML
3 INJECTION INTRAMUSCULAR; INTRAVENOUS; SUBCUTANEOUS EVERY 8 HOURS
Refills: 0 | Status: DISCONTINUED | OUTPATIENT
Start: 2023-06-29 | End: 2023-07-01

## 2023-06-29 RX ORDER — HYDROCORTISONE 1 %
1 OINTMENT (GRAM) TOPICAL EVERY 6 HOURS
Refills: 0 | Status: DISCONTINUED | OUTPATIENT
Start: 2023-06-29 | End: 2023-07-01

## 2023-06-29 RX ORDER — SODIUM CHLORIDE 9 MG/ML
500 INJECTION, SOLUTION INTRAVENOUS ONCE
Refills: 0 | Status: DISCONTINUED | OUTPATIENT
Start: 2023-06-29 | End: 2023-07-01

## 2023-06-29 RX ORDER — MAGNESIUM HYDROXIDE 400 MG/1
30 TABLET, CHEWABLE ORAL
Refills: 0 | Status: DISCONTINUED | OUTPATIENT
Start: 2023-06-29 | End: 2023-07-01

## 2023-06-29 RX ORDER — FENTANYL/BUPIVACAINE/NS/PF 2MCG/ML-.1
250 PLASTIC BAG, INJECTION (ML) INJECTION
Refills: 0 | Status: DISCONTINUED | OUTPATIENT
Start: 2023-06-29 | End: 2023-07-01

## 2023-06-29 RX ORDER — PRAMOXINE HYDROCHLORIDE 150 MG/15G
1 AEROSOL, FOAM RECTAL EVERY 4 HOURS
Refills: 0 | Status: DISCONTINUED | OUTPATIENT
Start: 2023-06-29 | End: 2023-07-01

## 2023-06-29 RX ORDER — IBUPROFEN 200 MG
600 TABLET ORAL EVERY 6 HOURS
Refills: 0 | Status: DISCONTINUED | OUTPATIENT
Start: 2023-06-29 | End: 2023-07-01

## 2023-06-29 RX ORDER — AER TRAVELER 0.5 G/1
1 SOLUTION RECTAL; TOPICAL EVERY 4 HOURS
Refills: 0 | Status: DISCONTINUED | OUTPATIENT
Start: 2023-06-29 | End: 2023-07-01

## 2023-06-29 RX ORDER — KETOROLAC TROMETHAMINE 30 MG/ML
30 SYRINGE (ML) INJECTION ONCE
Refills: 0 | Status: DISCONTINUED | OUTPATIENT
Start: 2023-06-29 | End: 2023-06-29

## 2023-06-29 RX ORDER — OXYCODONE HYDROCHLORIDE 5 MG/1
5 TABLET ORAL ONCE
Refills: 0 | Status: DISCONTINUED | OUTPATIENT
Start: 2023-06-29 | End: 2023-07-01

## 2023-06-29 RX ADMIN — SODIUM CHLORIDE 3 MILLILITER(S): 9 INJECTION INTRAMUSCULAR; INTRAVENOUS; SUBCUTANEOUS at 22:30

## 2023-06-29 RX ADMIN — Medication 600 MILLIGRAM(S): at 23:32

## 2023-06-29 RX ADMIN — Medication 975 MILLIGRAM(S): at 21:30

## 2023-06-29 RX ADMIN — Medication 125 MILLIUNIT(S)/MIN: at 18:10

## 2023-06-29 RX ADMIN — SODIUM CHLORIDE 125 MILLILITER(S): 9 INJECTION, SOLUTION INTRAVENOUS at 04:30

## 2023-06-29 RX ADMIN — Medication 1 SPRAY(S): at 23:33

## 2023-06-29 RX ADMIN — SODIUM CHLORIDE 125 MILLILITER(S): 9 INJECTION, SOLUTION INTRAVENOUS at 02:40

## 2023-06-29 RX ADMIN — Medication 1 APPLICATION(S): at 23:33

## 2023-06-29 RX ADMIN — Medication 30 MILLIGRAM(S): at 18:44

## 2023-06-29 RX ADMIN — SODIUM CHLORIDE 125 MILLILITER(S): 9 INJECTION, SOLUTION INTRAVENOUS at 15:01

## 2023-06-29 RX ADMIN — Medication 975 MILLIGRAM(S): at 20:48

## 2023-06-29 NOTE — PRE-ANESTHESIA EVALUATION ADULT - NSANTHOSAYNRD_GEN_A_CORE
No. CANDICE screening performed.  STOP BANG Legend: 0-2 = LOW Risk; 3-4 = INTERMEDIATE Risk; 5-8 = HIGH Risk

## 2023-06-30 RX ADMIN — SODIUM CHLORIDE 3 MILLILITER(S): 9 INJECTION INTRAMUSCULAR; INTRAVENOUS; SUBCUTANEOUS at 05:48

## 2023-06-30 RX ADMIN — Medication 975 MILLIGRAM(S): at 08:47

## 2023-06-30 RX ADMIN — Medication 600 MILLIGRAM(S): at 05:30

## 2023-06-30 RX ADMIN — Medication 975 MILLIGRAM(S): at 21:20

## 2023-06-30 RX ADMIN — Medication 975 MILLIGRAM(S): at 04:03

## 2023-06-30 RX ADMIN — Medication 600 MILLIGRAM(S): at 05:00

## 2023-06-30 RX ADMIN — SODIUM CHLORIDE 3 MILLILITER(S): 9 INJECTION INTRAMUSCULAR; INTRAVENOUS; SUBCUTANEOUS at 14:06

## 2023-06-30 RX ADMIN — Medication 1 TABLET(S): at 12:25

## 2023-06-30 RX ADMIN — Medication 600 MILLIGRAM(S): at 00:02

## 2023-06-30 RX ADMIN — Medication 975 MILLIGRAM(S): at 20:43

## 2023-06-30 RX ADMIN — Medication 975 MILLIGRAM(S): at 15:02

## 2023-06-30 RX ADMIN — Medication 975 MILLIGRAM(S): at 03:33

## 2023-06-30 RX ADMIN — Medication 600 MILLIGRAM(S): at 18:29

## 2023-06-30 RX ADMIN — Medication 600 MILLIGRAM(S): at 12:25

## 2023-06-30 NOTE — PROGRESS NOTE ADULT - SUBJECTIVE AND OBJECTIVE BOX
Patient seen and evaluated at bedside this morning, no acute events overnight.    She is lying comfortably in bed, reports pain is well controlled with tylenol and motrin. She has been ambulating without assistance, voiding spontaneously, and tolerating food.  Denies headache, vision changes, dizziness, chest pain, palpitations, shortness of breath, nausea/vomiting, or heavy vaginal bleeding. Reports decrease in amount of vaginal bleeding and denies clots.    Physical Exam:  T(C): 36.8 (06-30-23 @ 06:11), Max: 37.8 (06-29-23 @ 19:26)  HR: 61 (06-30-23 @ 06:11) (55 - 82)  BP: 113/76 (06-30-23 @ 06:11) (109/68 - 128/89)  RR: 18 (06-30-23 @ 06:11) (17 - 18)  SpO2: 98% (06-30-23 @ 06:11) (96% - 100%)    GA: NAD, A&O x 3  CV: regular rate, normotensive  Pulm: no inc WOB  Abd: soft, NT/ND, no rebound or guarding, uterus firm at midline and 2  fb below umbilicus  Perineum: normal lochia, intact, healing well  Extremities: mild pedal edema b/l, no calf tenderness                          12.5   8.85  )-----------( 181      ( 28 Jun 2023 12:37 )             37.3     06-28    136  |  104  |  8   ----------------------------<  85  4.1   |  21<L>  |  0.51    Ca    9.5      28 Jun 2023 12:37    TPro  6.7  /  Alb  3.5  /  TBili  <0.2  /  DBili  x   /  AST  25  /  ALT  11  /  AlkPhos  178<H>  06-28    acetaminophen     Tablet .. 975 milliGRAM(s) Oral <User Schedule>  benzocaine 20%/menthol 0.5% Spray 1 Spray(s) Topical every 6 hours PRN  dibucaine 1% Ointment 1 Application(s) Topical every 6 hours PRN  diphenhydrAMINE 25 milliGRAM(s) Oral every 6 hours PRN  diphtheria/tetanus/pertussis (acellular) Vaccine (Adacel) 0.5 milliLiter(s) IntraMuscular once  fentanyl (2 MICROgram(s)/mL) + bupivacaine 0.0625%  in 0.9% Sodium Chloride PCEA 250 milliLiter(s) Epidural PCA Continuous  hydrocortisone 1% Cream 1 Application(s) Topical every 6 hours PRN  ibuprofen  Tablet. 600 milliGRAM(s) Oral every 6 hours  lactated ringers Bolus 500 milliLiter(s) IV Bolus once  lanolin Ointment 1 Application(s) Topical every 6 hours PRN  magnesium hydroxide Suspension 30 milliLiter(s) Oral two times a day PRN  oxyCODONE    IR 5 milliGRAM(s) Oral every 3 hours PRN  oxyCODONE    IR 5 milliGRAM(s) Oral once PRN  oxytocin Infusion 41.667 milliUNIT(s)/Min IV Continuous <Continuous>  oxytocin Infusion. 1 milliUNIT(s)/Min IV Continuous <Continuous>  pramoxine 1%/zinc 5% Cream 1 Application(s) Topical every 4 hours PRN  prenatal multivitamin 1 Tablet(s) Oral daily  simethicone 80 milliGRAM(s) Chew every 4 hours PRN  sodium chloride 0.9% lock flush 3 milliLiter(s) IV Push every 8 hours  witch hazel Pads 1 Application(s) Topical every 4 hours PRN

## 2023-06-30 NOTE — PROGRESS NOTE ADULT - ASSESSMENT
A/P 27y s/p , c/b gHTN vs. CHTN, PPD1 , stable, meeting postpartum milestones   - Pain: well controlled on tylenol/motrin  - CV: gHTN vs. CHTN, normotensive, no toxic complaints, continue VSq4h  - GI: Tolerating regular diet  - : urinating without difficulty/pain  - DVT prophylaxis: ambulating frequently  - Dispo: PPD 2, unless otherwise specified

## 2023-06-30 NOTE — LACTATION INITIAL EVALUATION - LACTATION INTERVENTIONS
initiate/review safe skin-to-skin/initiate/review hand expression/initiate/review techniques for position and latch/post discharge community resources provided/review techniques to increase milk supply/reviewed components of an effective feeding and at least 8 effective feedings per day required/reviewed importance of monitoring infant diapers, the breastfeeding log, and minimum output each day/reviewed benefits and recommendations for rooming in/reviewed feeding on demand/by cue at least 8 times a day/reviewed indications of inadequate milk transfer that would require supplementation

## 2023-06-30 NOTE — LACTATION INITIAL EVALUATION - NS LACT CON REASON FOR REQ
reviewed bfing basics, positioning techniques, feeding/satiety cues, signs of good latch, and encouraged s2s contact. taught hand expression with return demo. advised responsive feeding 8-12x/day. RN/LC to perform latch check as needed./primaparous mom/staff request

## 2023-07-01 ENCOUNTER — TRANSCRIPTION ENCOUNTER (OUTPATIENT)
Age: 28
End: 2023-07-01

## 2023-07-01 VITALS
RESPIRATION RATE: 16 BRPM | OXYGEN SATURATION: 100 % | DIASTOLIC BLOOD PRESSURE: 83 MMHG | TEMPERATURE: 98 F | HEART RATE: 65 BPM | SYSTOLIC BLOOD PRESSURE: 121 MMHG

## 2023-07-01 PROCEDURE — 80053 COMPREHEN METABOLIC PANEL: CPT

## 2023-07-01 PROCEDURE — 86900 BLOOD TYPING SEROLOGIC ABO: CPT

## 2023-07-01 PROCEDURE — 99214 OFFICE O/P EST MOD 30 MIN: CPT

## 2023-07-01 PROCEDURE — 86901 BLOOD TYPING SEROLOGIC RH(D): CPT

## 2023-07-01 PROCEDURE — 86850 RBC ANTIBODY SCREEN: CPT

## 2023-07-01 PROCEDURE — 59050 FETAL MONITOR W/REPORT: CPT

## 2023-07-01 PROCEDURE — 86780 TREPONEMA PALLIDUM: CPT

## 2023-07-01 PROCEDURE — 85730 THROMBOPLASTIN TIME PARTIAL: CPT

## 2023-07-01 PROCEDURE — 84550 ASSAY OF BLOOD/URIC ACID: CPT

## 2023-07-01 PROCEDURE — 86769 SARS-COV-2 COVID-19 ANTIBODY: CPT

## 2023-07-01 PROCEDURE — 36415 COLL VENOUS BLD VENIPUNCTURE: CPT

## 2023-07-01 PROCEDURE — 83615 LACTATE (LD) (LDH) ENZYME: CPT

## 2023-07-01 PROCEDURE — 84156 ASSAY OF PROTEIN URINE: CPT

## 2023-07-01 PROCEDURE — 85384 FIBRINOGEN ACTIVITY: CPT

## 2023-07-01 PROCEDURE — 85025 COMPLETE CBC W/AUTO DIFF WBC: CPT

## 2023-07-01 PROCEDURE — 81001 URINALYSIS AUTO W/SCOPE: CPT

## 2023-07-01 PROCEDURE — 85610 PROTHROMBIN TIME: CPT

## 2023-07-01 PROCEDURE — 82570 ASSAY OF URINE CREATININE: CPT

## 2023-07-01 RX ORDER — FERROUS SULFATE 325(65) MG
1 TABLET ORAL
Refills: 0 | DISCHARGE

## 2023-07-01 RX ORDER — ACETAMINOPHEN 500 MG
3 TABLET ORAL
Qty: 0 | Refills: 0 | DISCHARGE
Start: 2023-07-01

## 2023-07-01 RX ORDER — IBUPROFEN 200 MG
1 TABLET ORAL
Qty: 0 | Refills: 0 | DISCHARGE
Start: 2023-07-01

## 2023-07-01 RX ADMIN — Medication 1 TABLET(S): at 12:43

## 2023-07-01 RX ADMIN — Medication 600 MILLIGRAM(S): at 05:33

## 2023-07-01 RX ADMIN — Medication 975 MILLIGRAM(S): at 15:08

## 2023-07-01 RX ADMIN — Medication 600 MILLIGRAM(S): at 06:03

## 2023-07-01 RX ADMIN — Medication 600 MILLIGRAM(S): at 12:44

## 2023-07-01 RX ADMIN — Medication 975 MILLIGRAM(S): at 09:30

## 2023-07-01 NOTE — DISCHARGE NOTE OB - MEDICATION SUMMARY - MEDICATIONS TO TAKE
I will START or STAY ON the medications listed below when I get home from the hospital:    ibuprofen 600 mg oral tablet  -- 1 tab(s) by mouth every 6 hours  -- Indication: For postpartum    acetaminophen 325 mg oral tablet  -- 3 tab(s) by mouth every 6 hours  -- Indication: For postpartum    PNV Select oral tablet  -- 1 tab(s) by mouth once a day  -- Indication: For postpartum

## 2023-07-01 NOTE — DISCHARGE NOTE OB - CARE PLAN
1 Principal Discharge DX:	Postpartum state  Assessment and plan of treatment:	Please follow-up with your OB doctor within 1-2 weeks f. You can resume a regular diet at home and you should continue your prenatal vitamins as directed. Please place nothing in the vagina for 6 weeks (no tampons, no sex, no douching, no tub baths, no swimming pools, etc). If you have severe headaches and/or vision changes, heavy bleeding, chest pain, please call your provider or go to the nearest ED. Please call your OB with any signs of symptoms of infection including fever > 100.4 degrees, severe pain, malodorous vaginal discharge or heavy bleeding requiring more than 1-2 pads/hour. You can take Motrin 600mg orally every 6 hours for pain as needed.  Secondary Diagnosis:	Chronic hypertension  Assessment and plan of treatment:	Follow up within your OB in one week for a blood pressure check. Check bp 3x a day. If blood pressure greater than 160/110, you develop a headache not relieved by tylenol, visual disturbances, or right upper abdominal pain, call your doctor or the hospital, or go to your nearest emergency room. Regular diet, normal activity, nothing in the vagina for 6 weeks--no sex, tampons, tub baths, or swimming pools.

## 2023-07-01 NOTE — DISCHARGE NOTE OB - PLAN OF CARE
Please follow-up with your OB doctor within 1-2 weeks f. You can resume a regular diet at home and you should continue your prenatal vitamins as directed. Please place nothing in the vagina for 6 weeks (no tampons, no sex, no douching, no tub baths, no swimming pools, etc). If you have severe headaches and/or vision changes, heavy bleeding, chest pain, please call your provider or go to the nearest ED. Please call your OB with any signs of symptoms of infection including fever > 100.4 degrees, severe pain, malodorous vaginal discharge or heavy bleeding requiring more than 1-2 pads/hour. You can take Motrin 600mg orally every 6 hours for pain as needed. Follow up within your OB in one week for a blood pressure check. Check bp 3x a day. If blood pressure greater than 160/110, you develop a headache not relieved by tylenol, visual disturbances, or right upper abdominal pain, call your doctor or the hospital, or go to your nearest emergency room. Regular diet, normal activity, nothing in the vagina for 6 weeks--no sex, tampons, tub baths, or swimming pools.

## 2023-07-01 NOTE — DISCHARGE NOTE OB - CARE PROVIDER_API CALL
Brandie Nunez  Obstetrics and Gynecology  [address] [place] [id number]  Phone: [phone]  Fax: [phone]  Follow Up Time:

## 2023-07-01 NOTE — PROGRESS NOTE ADULT - SUBJECTIVE AND OBJECTIVE BOX
Pt doing well. meeting all post partum milestones  AVSS; bp stable  pain well controlled; no HA/BV/RUQ pain  PPD 2 s/p ;   bp's stable   d/c today  PEC precautions given  f/u in 5 wks

## 2023-07-01 NOTE — DISCHARGE NOTE OB - HOSPITAL COURSE
Patient had nonsurgical vaginal delivery c/b gHTN vs cHTN.  Please see delivery note for details.  During postpartum course patient's vitals were stable, vaginal bleeding appropriate, and pain well controlled.  On day of discharge patient was ambulating, her pain controlled with oral medications, had adequate oral intake, and was voiding freely.  Discharge instructions and precautions were given.  Will return to clinic in 1-2 weeks for a blood pressure check.

## 2023-07-05 DIAGNOSIS — Z3A.38 38 WEEKS GESTATION OF PREGNANCY: ICD-10-CM

## 2023-07-05 DIAGNOSIS — D50.9 IRON DEFICIENCY ANEMIA, UNSPECIFIED: ICD-10-CM

## 2023-07-05 DIAGNOSIS — Z28.09 IMMUNIZATION NOT CARRIED OUT BECAUSE OF OTHER CONTRAINDICATION: ICD-10-CM

## 2023-08-04 ENCOUNTER — INPATIENT (INPATIENT)
Facility: HOSPITAL | Age: 28
LOS: 3 days | Discharge: HOME | DRG: 769 | End: 2023-08-08
Attending: STUDENT IN AN ORGANIZED HEALTH CARE EDUCATION/TRAINING PROGRAM | Admitting: GENERAL ACUTE CARE HOSPITAL
Payer: COMMERCIAL

## 2023-08-04 ENCOUNTER — APPOINTMENT (OUTPATIENT)
Dept: OBGYN | Facility: CLINIC | Age: 28
End: 2023-08-04
Payer: COMMERCIAL

## 2023-08-04 VITALS
WEIGHT: 179 LBS | OXYGEN SATURATION: 98 % | BODY MASS INDEX: 30.73 KG/M2 | SYSTOLIC BLOOD PRESSURE: 119 MMHG | DIASTOLIC BLOOD PRESSURE: 83 MMHG | HEART RATE: 80 BPM

## 2023-08-04 VITALS
TEMPERATURE: 99 F | SYSTOLIC BLOOD PRESSURE: 125 MMHG | RESPIRATION RATE: 18 BRPM | HEART RATE: 69 BPM | HEIGHT: 64 IN | OXYGEN SATURATION: 99 % | WEIGHT: 179.02 LBS | DIASTOLIC BLOOD PRESSURE: 84 MMHG

## 2023-08-04 DIAGNOSIS — Z30.09 ENCOUNTER FOR OTHER GENERAL COUNSELING AND ADVICE ON CONTRACEPTION: ICD-10-CM

## 2023-08-04 LAB
ALBUMIN SERPL ELPH-MCNC: 4.4 G/DL — SIGNIFICANT CHANGE UP (ref 3.3–5)
ALP SERPL-CCNC: 123 U/L — HIGH (ref 40–120)
ALT FLD-CCNC: 36 U/L — SIGNIFICANT CHANGE UP (ref 10–45)
ANION GAP SERPL CALC-SCNC: 12 MMOL/L — SIGNIFICANT CHANGE UP (ref 5–17)
APPEARANCE UR: ABNORMAL
AST SERPL-CCNC: 68 U/L — HIGH (ref 10–40)
BACTERIA # UR AUTO: PRESENT /HPF
BASOPHILS # BLD AUTO: 0.04 K/UL — SIGNIFICANT CHANGE UP (ref 0–0.2)
BASOPHILS NFR BLD AUTO: 0.3 % — SIGNIFICANT CHANGE UP (ref 0–2)
BILIRUB DIRECT SERPL-MCNC: 0.4 MG/DL — HIGH (ref 0–0.3)
BILIRUB INDIRECT FLD-MCNC: 0.4 MG/DL — SIGNIFICANT CHANGE UP (ref 0.2–1)
BILIRUB SERPL-MCNC: 0.8 MG/DL — SIGNIFICANT CHANGE UP (ref 0.2–1.2)
BILIRUB UR-MCNC: ABNORMAL
BUN SERPL-MCNC: 11 MG/DL — SIGNIFICANT CHANGE UP (ref 7–23)
CALCIUM SERPL-MCNC: 9 MG/DL — SIGNIFICANT CHANGE UP (ref 8.4–10.5)
CHLORIDE SERPL-SCNC: 103 MMOL/L — SIGNIFICANT CHANGE UP (ref 96–108)
CO2 SERPL-SCNC: 25 MMOL/L — SIGNIFICANT CHANGE UP (ref 22–31)
COLOR SPEC: YELLOW — SIGNIFICANT CHANGE UP
COMMENT - URINE: SIGNIFICANT CHANGE UP
CREAT SERPL-MCNC: 0.58 MG/DL — SIGNIFICANT CHANGE UP (ref 0.5–1.3)
DIFF PNL FLD: NEGATIVE — SIGNIFICANT CHANGE UP
EGFR: 126 ML/MIN/1.73M2 — SIGNIFICANT CHANGE UP
EOSINOPHIL # BLD AUTO: 0.06 K/UL — SIGNIFICANT CHANGE UP (ref 0–0.5)
EOSINOPHIL NFR BLD AUTO: 0.4 % — SIGNIFICANT CHANGE UP (ref 0–6)
EPI CELLS # UR: SIGNIFICANT CHANGE UP /HPF (ref 0–5)
GLUCOSE SERPL-MCNC: 142 MG/DL — HIGH (ref 70–99)
GLUCOSE UR QL: NEGATIVE — SIGNIFICANT CHANGE UP
HCG SERPL-ACNC: 3 MIU/ML — SIGNIFICANT CHANGE UP
HCT VFR BLD CALC: 39 % — SIGNIFICANT CHANGE UP (ref 34.5–45)
HGB BLD-MCNC: 13.2 G/DL — SIGNIFICANT CHANGE UP (ref 11.5–15.5)
IMM GRANULOCYTES NFR BLD AUTO: 0.4 % — SIGNIFICANT CHANGE UP (ref 0–0.9)
KETONES UR-MCNC: ABNORMAL MG/DL
LEUKOCYTE ESTERASE UR-ACNC: ABNORMAL
LIDOCAIN IGE QN: 36 U/L — SIGNIFICANT CHANGE UP (ref 7–60)
LYMPHOCYTES # BLD AUTO: 1.32 K/UL — SIGNIFICANT CHANGE UP (ref 1–3.3)
LYMPHOCYTES # BLD AUTO: 9.6 % — LOW (ref 13–44)
MCHC RBC-ENTMCNC: 28.7 PG — SIGNIFICANT CHANGE UP (ref 27–34)
MCHC RBC-ENTMCNC: 33.8 GM/DL — SIGNIFICANT CHANGE UP (ref 32–36)
MCV RBC AUTO: 84.8 FL — SIGNIFICANT CHANGE UP (ref 80–100)
MONOCYTES # BLD AUTO: 0.65 K/UL — SIGNIFICANT CHANGE UP (ref 0–0.9)
MONOCYTES NFR BLD AUTO: 4.7 % — SIGNIFICANT CHANGE UP (ref 2–14)
NEUTROPHILS # BLD AUTO: 11.67 K/UL — HIGH (ref 1.8–7.4)
NEUTROPHILS NFR BLD AUTO: 84.6 % — HIGH (ref 43–77)
NITRITE UR-MCNC: NEGATIVE — SIGNIFICANT CHANGE UP
NRBC # BLD: 0 /100 WBCS — SIGNIFICANT CHANGE UP (ref 0–0)
PH UR: 8 — SIGNIFICANT CHANGE UP (ref 5–8)
PLATELET # BLD AUTO: 285 K/UL — SIGNIFICANT CHANGE UP (ref 150–400)
POTASSIUM SERPL-MCNC: 4 MMOL/L — SIGNIFICANT CHANGE UP (ref 3.5–5.3)
POTASSIUM SERPL-SCNC: 4 MMOL/L — SIGNIFICANT CHANGE UP (ref 3.5–5.3)
PROT SERPL-MCNC: 7.3 G/DL — SIGNIFICANT CHANGE UP (ref 6–8.3)
PROT UR-MCNC: ABNORMAL MG/DL
RBC # BLD: 4.6 M/UL — SIGNIFICANT CHANGE UP (ref 3.8–5.2)
RBC # FLD: 12.3 % — SIGNIFICANT CHANGE UP (ref 10.3–14.5)
RBC CASTS # UR COMP ASSIST: < 5 /HPF — SIGNIFICANT CHANGE UP
SODIUM SERPL-SCNC: 140 MMOL/L — SIGNIFICANT CHANGE UP (ref 135–145)
SP GR SPEC: 1.02 — SIGNIFICANT CHANGE UP (ref 1–1.03)
UROBILINOGEN FLD QL: 1 E.U./DL — SIGNIFICANT CHANGE UP
WBC # BLD: 13.79 K/UL — HIGH (ref 3.8–10.5)
WBC # FLD AUTO: 13.79 K/UL — HIGH (ref 3.8–10.5)
WBC UR QL: < 5 /HPF — SIGNIFICANT CHANGE UP

## 2023-08-04 PROCEDURE — 99223 1ST HOSP IP/OBS HIGH 75: CPT | Mod: GC

## 2023-08-04 PROCEDURE — 0503F POSTPARTUM CARE VISIT: CPT

## 2023-08-04 PROCEDURE — 76705 ECHO EXAM OF ABDOMEN: CPT | Mod: 26

## 2023-08-04 PROCEDURE — 99285 EMERGENCY DEPT VISIT HI MDM: CPT

## 2023-08-04 RX ORDER — FERROUS SULFATE 325(65) MG
1 TABLET ORAL
Refills: 0 | DISCHARGE

## 2023-08-04 RX ORDER — ACETAMINOPHEN 500 MG
1000 TABLET ORAL ONCE
Refills: 0 | Status: COMPLETED | OUTPATIENT
Start: 2023-08-04 | End: 2023-08-04

## 2023-08-04 RX ORDER — SODIUM CHLORIDE 9 MG/ML
1000 INJECTION INTRAMUSCULAR; INTRAVENOUS; SUBCUTANEOUS ONCE
Refills: 0 | Status: COMPLETED | OUTPATIENT
Start: 2023-08-04 | End: 2023-08-04

## 2023-08-04 RX ORDER — FERROUS SULFATE 325(65) MG
325 TABLET ORAL
Refills: 0 | Status: DISCONTINUED | OUTPATIENT
Start: 2023-08-04 | End: 2023-08-06

## 2023-08-04 RX ORDER — ONDANSETRON 8 MG/1
4 TABLET, FILM COATED ORAL ONCE
Refills: 0 | Status: COMPLETED | OUTPATIENT
Start: 2023-08-04 | End: 2023-08-04

## 2023-08-04 RX ORDER — FAMOTIDINE 10 MG/ML
20 INJECTION INTRAVENOUS ONCE
Refills: 0 | Status: COMPLETED | OUTPATIENT
Start: 2023-08-04 | End: 2023-08-04

## 2023-08-04 RX ADMIN — ONDANSETRON 4 MILLIGRAM(S): 8 TABLET, FILM COATED ORAL at 22:02

## 2023-08-04 RX ADMIN — Medication 400 MILLIGRAM(S): at 22:02

## 2023-08-04 RX ADMIN — FAMOTIDINE 20 MILLIGRAM(S): 10 INJECTION INTRAVENOUS at 22:02

## 2023-08-04 RX ADMIN — SODIUM CHLORIDE 1000 MILLILITER(S): 9 INJECTION INTRAMUSCULAR; INTRAVENOUS; SUBCUTANEOUS at 21:46

## 2023-08-04 NOTE — ED PROVIDER NOTE - OBJECTIVE STATEMENT
26 yo F  presenting with 4-5 hours of RUQ abdominal pain, intermittent with associated nausea, no vomiting. No fever, chills, no cp or sob. No flank pain. No vaginal complaints, no urinary complaints. Had  1 month ago. No prior abd surgeries. No history of gallstones or kidney stones. ROS as above.

## 2023-08-04 NOTE — H&P ADULT - NSHPPHYSICALEXAM_GEN_ALL_CORE
VITAL SIGNS:  T(C): 37.1 (08-04-23 @ 21:10), Max: 37.1 (08-04-23 @ 21:10)  T(F): 98.7 (08-04-23 @ 21:10), Max: 98.7 (08-04-23 @ 21:10)  HR: 62 (08-04-23 @ 23:18) (62 - 69)  BP: 126/83 (08-04-23 @ 23:18) (125/84 - 126/83)  BP(mean): --  RR: 16 (08-04-23 @ 23:18) (16 - 18)  SpO2: 99% (08-04-23 @ 23:18) (99% - 99%)  Wt(kg): --    PHYSICAL EXAM:  Constitutional: Patient is in no acute distress, resting comfortably in bed.  HEENT: Atraumatic/normocephalic. Anicteric sclera, no oropharyngeal erythema or exudates; mucous membranes moist.   Neck: supple; no lymphadenopathy.   Respiratory: Clear to auscultation bilaterally; no Wheezing/Crackles/Ronchi.  Cardiac: Regular rate and rhythm, S1/S2; no Murmur/Rub/Gallop;   Gastrointestinal: abdomen soft, non-distended, (+) RUQ tenderness to deep palpation; no guarding; Normoactive bowel sounds. (-) Marte's sign.   Back: spine midline, no bony tenderness or step-offs;   Extremities: Warm and Well perfused, no peripheral edema, 2+ radial, Dorsalis pedis and posterior tibial pulses bilaterally.  Dermatologic: skin warm, dry and intact; no rashes, wounds, or scars  Neurologic: AAOx3; no focal deficits.  Psychiatric: affect and characteristics of appearance, verbalizations, behaviors are appropriate

## 2023-08-04 NOTE — ED PROVIDER NOTE - PROGRESS NOTE DETAILS
Pt had one episode of nbnb emesis in ER after providing urine sample. Given IV Zofran. Pending labs, ua, US pt still with pain, cbd dilated, no cholecystitis after discussion with radiology. will admit for mrcp. ordered for morphine.

## 2023-08-04 NOTE — ED ADULT TRIAGE NOTE - CHIEF COMPLAINT QUOTE
"I have been having RUQ pain that moves to the back for 4 hours. I am a bit nauseous. I gave birth 1 month ago but I don't think it is related, it was a normal birth." denies fever, cp, sob, vomiting, urinary sx or bloody stools.

## 2023-08-04 NOTE — ED PROVIDER NOTE - NS ED ROS FT
Constitutional: No fever or chills  Eyes: No discharge or drainage  Ears, Nose, Mouth, Throat: No nasal discharge, no sore throat  Cardiovascular: No chest pain, no palpitations  Respiratory: No shortness of breath, no cough  Gastrointestinal: + nausea, no vomiting, +abdominal pain, no diarrhea or constipation  Musculoskeletal: No joint pain, no swelling  Skin: No rashes or lesions  Neurological: No numbness, weakness, tingling, no headache  Psychiatric: No depression  Endocrine: No nightsweats, no weight loss  Hematologic/Lymphatic: No lymphadenopathy

## 2023-08-04 NOTE — H&P ADULT - ATTENDING COMMENTS
#Cholelithiasis: p/w RUQ abd pain w/ multiple GB stones, +CBD dilation, no pericholecystic fluid/GB wall thickening. Neg alonzo's sign. --- c/f choledocholithiasis, possible early acute cholecystitis. +leukocytosis and mild transaminitis. F/up Surgery consult for evaluation, MRCP vs ERCP pending GI recs. Start empiric ceft/flagyl, serial abd exams, NPO

## 2023-08-04 NOTE — H&P ADULT - NSHPREVIEWOFSYSTEMS_GEN_ALL_CORE
REVIEW OF SYSTEMS:    CONSTITUTIONAL: No fever, chills, or weakness.   EYES/ENT: No visual changes;  No ear pain, runny nose, or sore throat.   NECK: No pain or stiffness.  RESPIRATORY: No cough, wheezing, hemoptysis; No shortness of breath.  CARDIOVASCULAR: No chest pain, dyspnea on exertion, or palpitations.  GASTROINTESTINAL: No abdominal or epigastric pain. No nausea, vomiting, or hematemesis; No diarrhea or constipation. No melena or hematochezia.  GENITOURINARY: No dysuria, frequency or hematuria.  NEUROLOGICAL: No numbness or weakness.  SKIN: No itching, rashes. REVIEW OF SYSTEMS:    CONSTITUTIONAL: No fever, chills, or weakness.   EYES/ENT: No visual changes;  No ear pain or sore throat. (+) runny nose, resolved.  NECK: No pain or stiffness.  RESPIRATORY: No cough, wheezing, hemoptysis; No shortness of breath.  CARDIOVASCULAR: No chest pain, dyspnea on exertion, or palpitations.  GASTROINTESTINAL: (+) abdominal pain and nausea, no vomiting, or hematemesis; No diarrhea or constipation. No melena or hematochezia.  GENITOURINARY: No dysuria, frequency or hematuria.  NEUROLOGICAL: No numbness or weakness.  SKIN: No itching, rashes.

## 2023-08-04 NOTE — H&P ADULT - PROBLEM SELECTOR PLAN 1
Presents with several hours of intermittent crampy RUQ abdominal pain. RUQ US shows CBD dilation to 7mm and gallbladder distention with multiple visualized mobile stones, associated leukocytosis leading to intermediate likelihood of choledocholithiasis with concern for developing cholecystitis leading to admission for inpatient workup. On examination negative Marte's sign and no fevers, and no jaundice leading to low concern for cholangitis. Lipase low and no epigastric pain so low concern for pancreatitis.     - MRCP ordered.   - GI consult in AM.  - Discuss need for surgery consult.   - Low fat diet.   - Pain control with Tylenol/Ibuprofen as tolerated.   - Will hold off on antibiotics at this time. Presents with several hours of intermittent crampy RUQ abdominal pain. RUQ US shows CBD dilation to 7mm and gallbladder distention with multiple visualized mobile stones, associated leukocytosis leading to intermediate likelihood of choledocholithiasis with concern for developing cholecystitis leading to admission for inpatient workup. On examination negative Marte's sign and no fevers, and no jaundice leading to low concern for cholangitis. Lipase low and no epigastric pain so low concern for pancreatitis.     - Surgery Consult.   - Start Ceftriaxone and Flagyl.   - MRCP ordered.   - GI consult in AM.  - Pain control with Tylenol/Toradol PRN. Presents with several hours of intermittent crampy RUQ abdominal pain. RUQ US shows CBD dilation to 7mm and gallbladder distention with multiple visualized mobile stones, associated leukocytosis leading to intermediate likelihood of choledocholithiasis with concern for developing cholecystitis leading to admission for inpatient workup. On examination negative Marte's sign and no fevers, and no jaundice leading to low concern for cholangitis. Lipase low and no epigastric pain so low concern for pancreatitis.     - Surgery Consult.   - Start Ceftriaxone and Flagyl.   - MRCP ordered.   - Serial Abdominal Exam.  - GI consult in AM.  - Pain control with Tylenol/Toradol PRN.

## 2023-08-04 NOTE — H&P ADULT - NSHPLABSRESULTS_GEN_ALL_CORE
.  LABS:                         13.2   13.79 )-----------( 285      ( 04 Aug 2023 22:04 )             39.0     08-    140  |  103  |  11  ----------------------------<  142<H>  4.0   |  25  |  0.58    Ca    9.0      04 Aug 2023 22:04    TPro  7.3  /  Alb  4.4  /  TBili  0.8  /  DBili  0.4<H>  /  AST  68<H>  /  ALT  36  /  AlkPhos  123<H>        Urinalysis Basic - ( 04 Aug 2023 22:05 )    Color: Yellow / Appearance: SL Cloudy / S.020 / pH: x  Gluc: x / Ketone: Trace mg/dL  / Bili: Small / Urobili: 1.0 E.U./dL   Blood: x / Protein: Trace mg/dL / Nitrite: NEGATIVE   Leuk Esterase: Trace / RBC: < 5 /HPF / WBC < 5 /HPF   Sq Epi: x / Non Sq Epi: x / Bacteria: Present /HPF                RADIOLOGY, EKG & ADDITIONAL TESTS: Reviewed.

## 2023-08-04 NOTE — H&P ADULT - HISTORY OF PRESENT ILLNESS
Ms. Matt is a 27 year old female  with spontaneous vaginal delievery c/b gestational hypertension one month prior who presents to the ED with RUQ abdominal pain for the past 4-5 hours associated with nausea, admitted for gallbladder distention to rule out choledocolithiasis.     Patient denies abdominal surgery, history of gallstones, or kidney stones. Patient denies nausea, fevers, chills, chest pain, shortness of breath, flank pain, dysuria.     Ms. Matt is a 27 year old female  with induced vaginal delievery course complicated by gestational hypertension one month prior who presents to the ED with RUQ abdominal pain for the past 5 hours associated with nausea, admitted for gallbladder distention to rule out choledocolithiasis.     Patient reports that around 4pm this afternoon she developed 8/10 intermittent crampy RUQ abdominal pain which has improved to intermittent 6/10 pain. Associated with moderate nausea and mild pleuritic back pain leading to shortness of breath. Denies abdominal surgery, history of gallstones, liver disease, or kidney stones. Patient denies fevers, chills, chest pain, shortness of breath, flank pain, constipation, diarrhea, vomiting, dysuria.      Patient had no postpartum complications and her blood pressure improved back to normal. Patient does breastfeed her child.    In the ED the patient was afebrile, HR of 69, BP of 125/84, saturating 99% on room air.  Patient was treated with Ofirmev and 1 L IVF. Labs significant for leukocytosis to 13 with neutrophil predominance, mild alk phos, mild AST elevation, mild direct bilirubin elevation. RUQ US ordered showing. 1. Distended gallbladder with multiple mobile stones. No pericholecystic fluid, wall thickening, or Marte's sign. Findings are equivocal for acute cholecystitis. 2. Dilated common bile duct, 7mm. Recommend further evaluation with MRCP to evaluate for choledocholithiasis. 3. Hepatomegaly. Ms. Matt is a 27 year old female  with induced vaginal delivery course complicated by gestational hypertension one month prior who presents to the ED with RUQ abdominal pain for the past 5 hours associated with nausea, admitted for gallbladder distention to rule out choledocholithiasis     Patient reports that around 4pm this afternoon she developed 8/10 intermittent crampy RUQ abdominal pain which has improved to intermittent 6/10 pain. Associated with moderate nausea and mild pleuritic back pain leading to shortness of breath. Denies abdominal surgery, history of gallstones, liver disease, or kidney stones. Patient denies fevers, chills, chest pain, shortness of breath, flank pain, constipation, diarrhea, vomiting, dysuria.      Patient had no postpartum complications and her blood pressure improved back to normal. Patient does breastfeed her child.    In the ED the patient was afebrile, HR of 69, BP of 125/84, saturating 99% on room air.  Patient was treated with Ofirmev and 1 L IVF. Labs significant for leukocytosis to 13 with neutrophil predominance, mild alk phos, mild AST elevation, mild direct bilirubin elevation. RUQ US ordered showing. 1. Distended gallbladder with multiple mobile stones. No pericholecystic fluid, wall thickening, or Marte's sign. Findings are equivocal for acute cholecystitis. 2. Dilated common bile duct, 7mm. Recommend further evaluation with MRCP to evaluate for choledocholithiasis. 3. Hepatomegaly.

## 2023-08-04 NOTE — ED PROVIDER NOTE - CLINICAL SUMMARY MEDICAL DECISION MAKING FREE TEXT BOX
28 yo F with RUQ abdominal pain X several hours, minimal ttp in ruq without r/g. Will check labs, ua for ro infection, US for ro cholecystitis though low suspicion, more likely cholelithiasis. Will give GI cocktail, IVF, antiemetic, reassess.

## 2023-08-04 NOTE — ED ADULT NURSE NOTE - OBJECTIVE STATEMENT
Presents for c/o severe RUQ and epigastric pain x 5 hours PTA associated with nausea, denies weakness/dizzines/abnormal bms.     On assessment- AOx4, breathing even and unlabored on RA, no apparent distress, VSS in triage, able to speak in clear coherent sentences, steady gait unassisted, neuro intact with no apparent facial asymmetry, PERRLA. Had episode of emesis in ED- food contents, no blood, no bile.

## 2023-08-04 NOTE — H&P ADULT - PROBLEM SELECTOR PLAN 3
QTC noted to be mildly prolonged at 486, noted to have mild bradycardia.     - Care with QT prolonging agents.   - Continue to monitor.

## 2023-08-04 NOTE — H&P ADULT - PROBLEM SELECTOR PLAN 4
F: 1L NS in ED.   E: Replete PRN.   N: Low fat diet.   DVT ppx: Low risk.   Disposition: F F: 1L NS in ED.   E: Replete PRN.   N: NPO  DVT ppx: Low risk.   Disposition: [facility]

## 2023-08-04 NOTE — H&P ADULT - ASSESSMENT
Ms. Matt is a 27 year old female  with spontaneous vaginal delievery c/b gestational hypertension one month prior who presents to the ED with RUQ abdominal pain for the past 5 hours associated with nausea, admitted for cholelithiasis and biliary distention, rule out choledocholithiasis

## 2023-08-04 NOTE — ED PROVIDER NOTE - PHYSICAL EXAMINATION
general: Well appearing, in no acute distress  HEENT: Normocephalic, atraumatic, extraocular movements intact  CV: Regular rate  Pulm: No respiratory distress, no tachypnea  Abd: Flat, no gross distension, soft, minimal ttp of ruq without r/g  Ext: warm and well perfused  Skin: No gross rashes or lesions  Neuro: Alert and oriented, moving all extremities

## 2023-08-04 NOTE — ED ADULT NURSE NOTE - NSFALLUNIVINTERV_ED_ALL_ED
Bed/Stretcher in lowest position, wheels locked, appropriate side rails in place/Call bell, personal items and telephone in reach/Instruct patient to call for assistance before getting out of bed/chair/stretcher/Non-slip footwear applied when patient is off stretcher/[place] to call system/Physically safe environment - no spills, clutter or unnecessary equipment/Purposeful proactive rounding/Room/bathroom lighting operational, light cord in reach

## 2023-08-05 DIAGNOSIS — Z29.9 ENCOUNTER FOR PROPHYLACTIC MEASURES, UNSPECIFIED: ICD-10-CM

## 2023-08-05 DIAGNOSIS — R74.01 ELEVATION OF LEVELS OF LIVER TRANSAMINASE LEVELS: ICD-10-CM

## 2023-08-05 DIAGNOSIS — K80.20 CALCULUS OF GALLBLADDER WITHOUT CHOLECYSTITIS WITHOUT OBSTRUCTION: ICD-10-CM

## 2023-08-05 DIAGNOSIS — R94.31 ABNORMAL ELECTROCARDIOGRAM [ECG] [EKG]: ICD-10-CM

## 2023-08-05 LAB
ALBUMIN SERPL ELPH-MCNC: 3.8 G/DL — SIGNIFICANT CHANGE UP (ref 3.3–5)
ALP SERPL-CCNC: 124 U/L — HIGH (ref 40–120)
ALT FLD-CCNC: 145 U/L — HIGH (ref 10–45)
ANION GAP SERPL CALC-SCNC: 7 MMOL/L — SIGNIFICANT CHANGE UP (ref 5–17)
ANION GAP SERPL CALC-SCNC: 9 MMOL/L — SIGNIFICANT CHANGE UP (ref 5–17)
AST SERPL-CCNC: 256 U/L — HIGH (ref 10–40)
BASOPHILS # BLD AUTO: 0.02 K/UL — SIGNIFICANT CHANGE UP (ref 0–0.2)
BASOPHILS NFR BLD AUTO: 0.2 % — SIGNIFICANT CHANGE UP (ref 0–2)
BILIRUB DIRECT SERPL-MCNC: 0.6 MG/DL — HIGH (ref 0–0.3)
BILIRUB INDIRECT FLD-MCNC: 0.7 MG/DL — SIGNIFICANT CHANGE UP (ref 0.2–1)
BILIRUB SERPL-MCNC: 1.3 MG/DL — HIGH (ref 0.2–1.2)
BILIRUB SERPL-MCNC: 1.3 MG/DL — HIGH (ref 0.2–1.2)
BUN SERPL-MCNC: 7 MG/DL — SIGNIFICANT CHANGE UP (ref 7–23)
BUN SERPL-MCNC: 8 MG/DL — SIGNIFICANT CHANGE UP (ref 7–23)
CALCIUM SERPL-MCNC: 8.4 MG/DL — SIGNIFICANT CHANGE UP (ref 8.4–10.5)
CALCIUM SERPL-MCNC: 8.8 MG/DL — SIGNIFICANT CHANGE UP (ref 8.4–10.5)
CHLORIDE SERPL-SCNC: 105 MMOL/L — SIGNIFICANT CHANGE UP (ref 96–108)
CHLORIDE SERPL-SCNC: 106 MMOL/L — SIGNIFICANT CHANGE UP (ref 96–108)
CO2 SERPL-SCNC: 24 MMOL/L — SIGNIFICANT CHANGE UP (ref 22–31)
CO2 SERPL-SCNC: 27 MMOL/L — SIGNIFICANT CHANGE UP (ref 22–31)
CREAT SERPL-MCNC: 0.54 MG/DL — SIGNIFICANT CHANGE UP (ref 0.5–1.3)
CREAT SERPL-MCNC: 0.54 MG/DL — SIGNIFICANT CHANGE UP (ref 0.5–1.3)
EGFR: 128 ML/MIN/1.73M2 — SIGNIFICANT CHANGE UP
EGFR: 128 ML/MIN/1.73M2 — SIGNIFICANT CHANGE UP
EOSINOPHIL # BLD AUTO: 0.03 K/UL — SIGNIFICANT CHANGE UP (ref 0–0.5)
EOSINOPHIL NFR BLD AUTO: 0.3 % — SIGNIFICANT CHANGE UP (ref 0–6)
GLUCOSE SERPL-MCNC: 109 MG/DL — HIGH (ref 70–99)
GLUCOSE SERPL-MCNC: 89 MG/DL — SIGNIFICANT CHANGE UP (ref 70–99)
HCT VFR BLD CALC: 37.4 % — SIGNIFICANT CHANGE UP (ref 34.5–45)
HGB BLD-MCNC: 12.9 G/DL — SIGNIFICANT CHANGE UP (ref 11.5–15.5)
IMM GRANULOCYTES NFR BLD AUTO: 0.3 % — SIGNIFICANT CHANGE UP (ref 0–0.9)
LYMPHOCYTES # BLD AUTO: 1.34 K/UL — SIGNIFICANT CHANGE UP (ref 1–3.3)
LYMPHOCYTES # BLD AUTO: 15.1 % — SIGNIFICANT CHANGE UP (ref 13–44)
MAGNESIUM SERPL-MCNC: 1.8 MG/DL — SIGNIFICANT CHANGE UP (ref 1.6–2.6)
MCHC RBC-ENTMCNC: 29.3 PG — SIGNIFICANT CHANGE UP (ref 27–34)
MCHC RBC-ENTMCNC: 34.5 GM/DL — SIGNIFICANT CHANGE UP (ref 32–36)
MCV RBC AUTO: 84.8 FL — SIGNIFICANT CHANGE UP (ref 80–100)
MONOCYTES # BLD AUTO: 0.68 K/UL — SIGNIFICANT CHANGE UP (ref 0–0.9)
MONOCYTES NFR BLD AUTO: 7.7 % — SIGNIFICANT CHANGE UP (ref 2–14)
NEUTROPHILS # BLD AUTO: 6.77 K/UL — SIGNIFICANT CHANGE UP (ref 1.8–7.4)
NEUTROPHILS NFR BLD AUTO: 76.4 % — SIGNIFICANT CHANGE UP (ref 43–77)
NRBC # BLD: 0 /100 WBCS — SIGNIFICANT CHANGE UP (ref 0–0)
PHOSPHATE SERPL-MCNC: 4.2 MG/DL — SIGNIFICANT CHANGE UP (ref 2.5–4.5)
PLATELET # BLD AUTO: 260 K/UL — SIGNIFICANT CHANGE UP (ref 150–400)
POTASSIUM SERPL-MCNC: 3.9 MMOL/L — SIGNIFICANT CHANGE UP (ref 3.5–5.3)
POTASSIUM SERPL-MCNC: 4.2 MMOL/L — SIGNIFICANT CHANGE UP (ref 3.5–5.3)
POTASSIUM SERPL-SCNC: 3.9 MMOL/L — SIGNIFICANT CHANGE UP (ref 3.5–5.3)
POTASSIUM SERPL-SCNC: 4.2 MMOL/L — SIGNIFICANT CHANGE UP (ref 3.5–5.3)
PROT SERPL-MCNC: 6.4 G/DL — SIGNIFICANT CHANGE UP (ref 6–8.3)
RBC # BLD: 4.41 M/UL — SIGNIFICANT CHANGE UP (ref 3.8–5.2)
RBC # FLD: 12.3 % — SIGNIFICANT CHANGE UP (ref 10.3–14.5)
SODIUM SERPL-SCNC: 138 MMOL/L — SIGNIFICANT CHANGE UP (ref 135–145)
SODIUM SERPL-SCNC: 140 MMOL/L — SIGNIFICANT CHANGE UP (ref 135–145)
WBC # BLD: 8.87 K/UL — SIGNIFICANT CHANGE UP (ref 3.8–10.5)
WBC # FLD AUTO: 8.87 K/UL — SIGNIFICANT CHANGE UP (ref 3.8–10.5)

## 2023-08-05 PROCEDURE — 74183 MRI ABD W/O CNTR FLWD CNTR: CPT | Mod: 26

## 2023-08-05 PROCEDURE — 99233 SBSQ HOSP IP/OBS HIGH 50: CPT

## 2023-08-05 PROCEDURE — 99222 1ST HOSP IP/OBS MODERATE 55: CPT

## 2023-08-05 RX ORDER — SODIUM CHLORIDE 9 MG/ML
1000 INJECTION, SOLUTION INTRAVENOUS
Refills: 0 | Status: DISCONTINUED | OUTPATIENT
Start: 2023-08-05 | End: 2023-08-06

## 2023-08-05 RX ORDER — METRONIDAZOLE 500 MG
500 TABLET ORAL EVERY 8 HOURS
Refills: 0 | Status: DISCONTINUED | OUTPATIENT
Start: 2023-08-05 | End: 2023-08-07

## 2023-08-05 RX ORDER — ONDANSETRON 8 MG/1
4 TABLET, FILM COATED ORAL EVERY 8 HOURS
Refills: 0 | Status: DISCONTINUED | OUTPATIENT
Start: 2023-08-05 | End: 2023-08-07

## 2023-08-05 RX ORDER — METRONIDAZOLE 500 MG
TABLET ORAL
Refills: 0 | Status: DISCONTINUED | OUTPATIENT
Start: 2023-08-05 | End: 2023-08-07

## 2023-08-05 RX ORDER — ACETAMINOPHEN 500 MG
650 TABLET ORAL EVERY 6 HOURS
Refills: 0 | Status: DISCONTINUED | OUTPATIENT
Start: 2023-08-05 | End: 2023-08-07

## 2023-08-05 RX ORDER — METRONIDAZOLE 500 MG
500 TABLET ORAL ONCE
Refills: 0 | Status: COMPLETED | OUTPATIENT
Start: 2023-08-05 | End: 2023-08-05

## 2023-08-05 RX ORDER — CEFTRIAXONE 500 MG/1
1000 INJECTION, POWDER, FOR SOLUTION INTRAMUSCULAR; INTRAVENOUS EVERY 24 HOURS
Refills: 0 | Status: DISCONTINUED | OUTPATIENT
Start: 2023-08-05 | End: 2023-08-07

## 2023-08-05 RX ORDER — LANOLIN ALCOHOL/MO/W.PET/CERES
3 CREAM (GRAM) TOPICAL AT BEDTIME
Refills: 0 | Status: DISCONTINUED | OUTPATIENT
Start: 2023-08-05 | End: 2023-08-07

## 2023-08-05 RX ADMIN — CEFTRIAXONE 100 MILLIGRAM(S): 500 INJECTION, POWDER, FOR SOLUTION INTRAMUSCULAR; INTRAVENOUS at 01:39

## 2023-08-05 RX ADMIN — Medication 1 TABLET(S): at 09:13

## 2023-08-05 RX ADMIN — Medication 325 MILLIGRAM(S): at 09:12

## 2023-08-05 RX ADMIN — SODIUM CHLORIDE 120 MILLILITER(S): 9 INJECTION, SOLUTION INTRAVENOUS at 17:43

## 2023-08-05 RX ADMIN — Medication 100 MILLIGRAM(S): at 16:52

## 2023-08-05 RX ADMIN — Medication 100 MILLIGRAM(S): at 09:13

## 2023-08-05 RX ADMIN — Medication 100 MILLIGRAM(S): at 02:49

## 2023-08-05 RX ADMIN — Medication 650 MILLIGRAM(S): at 02:39

## 2023-08-05 RX ADMIN — Medication 650 MILLIGRAM(S): at 01:39

## 2023-08-05 NOTE — CONSULT NOTE ADULT - ATTENDING COMMENTS
Patient is a 27 year old woman with history of anemia, hepatomegaly, gestational hypertension, now 1 month post partum, admitted to medicine team for evaluation of abdominal pain. Complained of one day history of right upper quadrant abdominal pain with radiation to back, associated nausea and emesis. At time of evaluation, afebrile, hemodynamically stable, abdomen soft, mild RUQ tenderness to palpation, nondistended, no rebound or guarding. No previous abdominal surgeries. Labs significant for leukocytosis WBC 14, T bili elevated, US demonstrates dilated and distended gallbladder with cholelithiasis, dilated common bile duct to 7 mm. Recommend bowel rest, IVF, IV antibiotics for clinical concern of acute cholecystitis, MRCP to evaluate for choledocholithiasis. Will follow closely.

## 2023-08-05 NOTE — PROGRESS NOTE ADULT - PROBLEM SELECTOR PLAN 4
F: 1L NS in ED.   E: Replete PRN.   N: NPO  DVT ppx: Low risk. holding   Disposition: F QTC noted to be mildly prolonged at 486, noted to have mild bradycardia.     - Care with QT prolonging agents.   - Continue to monitor.

## 2023-08-05 NOTE — PROGRESS NOTE ADULT - SUBJECTIVE AND OBJECTIVE BOX
Patient is a 27y old  Female who presents with a chief complaint of Cholelithiasis and Gallbladder distention. (05 Aug 2023 06:11)      INTERVAL HPI/OVERNIGHT EVENTS: offers no new complaints; current symptoms resolving    MEDICATIONS  (STANDING):  cefTRIAXone   IVPB 1000 milliGRAM(s) IV Intermittent every 24 hours  ferrous    sulfate 325 milliGRAM(s) Oral <User Schedule>  metroNIDAZOLE  IVPB 500 milliGRAM(s) IV Intermittent every 8 hours  metroNIDAZOLE  IVPB      prenatal multivitamin 1 Tablet(s) Oral daily    MEDICATIONS  (PRN):  acetaminophen     Tablet .. 650 milliGRAM(s) Oral every 6 hours PRN Temp greater or equal to 38C (100.4F), Mild Pain (1 - 3)  melatonin 3 milliGRAM(s) Oral at bedtime PRN Insomnia  ondansetron Injectable 4 milliGRAM(s) IV Push every 8 hours PRN Nausea and/or Vomiting      __________________________________________________  REVIEW OF SYSTEMS:    CONSTITUTIONAL: No fever,   EYES: no acute visual disturbances  NECK: No pain or stiffness  RESPIRATORY: No cough; No shortness of breath  CARDIOVASCULAR: No chest pain, no palpitations  GASTROINTESTINAL: + pain. No nausea or vomiting; No diarrhea   NEUROLOGICAL: No headache or numbness, no tremors  MUSCULOSKELETAL: No joint pain, no muscle pain  GENITOURINARY: no dysuria, no frequency, no hesitancy  PSYCHIATRY: no depression , no anxiety  ALL OTHER  ROS negative        Vital Signs Last 24 Hrs  T(C): 36.9 (05 Aug 2023 09:21), Max: 37.1 (04 Aug 2023 21:10)  T(F): 98.4 (05 Aug 2023 09:21), Max: 98.7 (04 Aug 2023 21:10)  HR: 69 (05 Aug 2023 09:21) (62 - 72)  BP: 113/77 (05 Aug 2023 09:21) (109/74 - 136/86)  BP(mean): --  RR: 16 (05 Aug 2023 09:21) (16 - 18)  SpO2: 98% (05 Aug 2023 09:21) (96% - 99%)    Parameters below as of 05 Aug 2023 09:21  Patient On (Oxygen Delivery Method): room air        ________________________________________________  PHYSICAL EXAM:  GENERAL: NAD  HEENT: Normocephalic;  conjunctivae and sclerae clear; moist mucous membranes;   NECK : supple  CHEST/LUNG: Clear to auscultation bilaterally with good air entry   HEART: S1 S2  regular; no murmurs, gallops or rubs  ABDOMEN: Soft, RUQ pain improving, Nondistended; Bowel sounds present  EXTREMITIES: no cyanosis; no edema; no calf tenderness  SKIN: warm and dry; no rash  NERVOUS SYSTEM:  Awake and alert; Oriented  to place, person and time ; no new deficits    _________________________________________________  LABS:                        12.9   8.87  )-----------( 260      ( 05 Aug 2023 06:20 )             37.4     08-05    138  |  105  |  8   ----------------------------<  109<H>  4.2   |  24  |  0.54    Ca    8.4      05 Aug 2023 06:20  Phos  4.2     08-05  Mg     1.8     08-05    TPro  6.4  /  Alb  3.8  /  TBili  1.3<H>  /  DBili  x   /  AST  256<H>  /  ALT  145<H>  /  AlkPhos  124<H>  08-05      Urinalysis Basic - ( 05 Aug 2023 06:20 )    Color: x / Appearance: x / SG: x / pH: x  Gluc: 109 mg/dL / Ketone: x  / Bili: x / Urobili: x   Blood: x / Protein: x / Nitrite: x   Leuk Esterase: x / RBC: x / WBC x   Sq Epi: x / Non Sq Epi: x / Bacteria: x      CAPILLARY BLOOD GLUCOSE            RADIOLOGY & ADDITIONAL TESTS:      Plan of care was discussed with patient and /or primary care giver; all questions and concerns were addressed and care was aligned with patient's wishes.

## 2023-08-05 NOTE — PROGRESS NOTE ADULT - SUBJECTIVE AND OBJECTIVE BOX
HPI:  Ms. Matt is a 27 year old female  with induced vaginal delivery course complicated by gestational hypertension one month prior who presents to the ED with RUQ abdominal pain for the past 5 hours associated with nausea, admitted for gallbladder distention to rule out choledocholithiasis     Patient reports that around 4pm this afternoon she developed 8/10 intermittent crampy RUQ abdominal pain which has improved to intermittent 6/10 pain. Associated with moderate nausea and mild pleuritic back pain leading to shortness of breath. Denies abdominal surgery, history of gallstones, liver disease, or kidney stones. Patient denies fevers, chills, chest pain, shortness of breath, flank pain, constipation, diarrhea, vomiting, dysuria.      Patient had no postpartum complications and her blood pressure improved back to normal. Patient does breastfeed her child.    In the ED the patient was afebrile, HR of 69, BP of 125/84, saturating 99% on room air.  Patient was treated with Ofirmev and 1 L IVF. Labs significant for leukocytosis to 13 with neutrophil predominance, mild alk phos, mild AST elevation, mild direct bilirubin elevation. RUQ US ordered showing. 1. Distended gallbladder with multiple mobile stones. No pericholecystic fluid, wall thickening, or Marte's sign. Findings are equivocal for acute cholecystitis. 2. Dilated common bile duct, 7mm. Recommend further evaluation with MRCP to evaluate for choledocholithiasis. 3. Hepatomegaly. (04 Aug 2023 23:16)      PAST MEDICAL & SURGICAL HISTORY:  Gestational hypertension      Vaginal delivery          REVIEW OF SYSTEMS      General:	    Skin/Breast:  	  Ophthalmologic:  	  ENMT:	    Respiratory and Thorax:  	  Cardiovascular:	    Gastrointestinal:	    Genitourinary:	    Musculoskeletal:	    Neurological:	    Psychiatric:	    Hematology/Lymphatics:	    Endocrine:	    Allergic/Immunologic:	    MEDICATIONS  (STANDING):  cefTRIAXone   IVPB 1000 milliGRAM(s) IV Intermittent every 24 hours  ferrous    sulfate 325 milliGRAM(s) Oral <User Schedule>  metroNIDAZOLE  IVPB 500 milliGRAM(s) IV Intermittent every 8 hours  metroNIDAZOLE  IVPB      prenatal multivitamin 1 Tablet(s) Oral daily    MEDICATIONS  (PRN):  acetaminophen     Tablet .. 650 milliGRAM(s) Oral every 6 hours PRN Temp greater or equal to 38C (100.4F), Mild Pain (1 - 3)  melatonin 3 milliGRAM(s) Oral at bedtime PRN Insomnia  ondansetron Injectable 4 milliGRAM(s) IV Push every 8 hours PRN Nausea and/or Vomiting      Allergies    No Known Allergies    Intolerances        SOCIAL HISTORY:    FAMILY HISTORY:      Vital Signs Last 24 Hrs  T(C): 36.7 (05 Aug 2023 00:16), Max: 37.1 (04 Aug 2023 21:10)  T(F): 98.1 (05 Aug 2023 00:16), Max: 98.7 (04 Aug 2023 21:10)  HR: 72 (05 Aug 2023 00:16) (62 - 72)  BP: 136/86 (05 Aug 2023 00:16) (125/84 - 136/86)  BP(mean): --  RR: 16 (05 Aug 2023 00:16) (16 - 18)  SpO2: 96% (05 Aug 2023 00:16) (96% - 99%)    Parameters below as of 05 Aug 2023 00:16  Patient On (Oxygen Delivery Method): room air        PHYSICAL EXAM:   Gen: NAD, resting comfortably   CV: NSR  Pulm: no respiratory distress on RA, CTAB   Abd: soft, ND, NTTP, no rebound or guarding   Ext: WWP, no edema   Neuro: motor/sensory grossly intact     LABS:                        13.2   13.79 )-----------( 285      ( 04 Aug 2023 22:04 )             39.0     08-04    140  |  103  |  11  ----------------------------<  142<H>  4.0   |  25  |  0.58    Ca    9.0      04 Aug 2023 22:04    TPro  7.3  /  Alb  4.4  /  TBili  0.8  /  DBili  0.4<H>  /  AST  68<H>  /  ALT  36  /  AlkPhos  123<H>  08-04    LIVER FUNCTIONS - ( 04 Aug 2023 22:04 )  Alb: 4.4 g/dL / Pro: 7.3 g/dL / ALK PHOS: 123 U/L / ALT: 36 U/L / AST: 68 U/L / GGT: x                 CAPILLARY BLOOD GLUCOSE        Urinalysis Basic - ( 04 Aug 2023 22:05 )    Color: Yellow / Appearance: SL Cloudy / S.020 / pH: x  Gluc: x / Ketone: Trace mg/dL  / Bili: Small / Urobili: 1.0 E.U./dL   Blood: x / Protein: Trace mg/dL / Nitrite: NEGATIVE   Leuk Esterase: Trace / RBC: < 5 /HPF / WBC < 5 /HPF   Sq Epi: x / Non Sq Epi: x / Bacteria: Present /HPF        Urinalysis with Rflx Culture (collected 04 Aug 2023 22:05)         RADIOLOGY & ADDITIONAL STUDIES:    Plan discussed with attending and chief resident.   ____________________________________________________  [facility] - Resident   Surgery  HPI:  Ms. Matt is a 27 year old female  with induced vaginal delivery course complicated by gestational hypertension one month prior who presents to the ED with RUQ abdominal pain for the past 5 hours associated with nausea, admitted for gallbladder distention to rule out choledocholithiasis     Patient reports that around 4pm this afternoon she developed 8/10 intermittent crampy RUQ abdominal pain which has improved to intermittent 6/10 pain. Associated with moderate nausea and mild pleuritic back pain leading to shortness of breath. Denies abdominal surgery, history of gallstones, liver disease, or kidney stones. Patient denies fevers, chills, chest pain, shortness of breath, flank pain, constipation, diarrhea, vomiting, dysuria.      Patient had no postpartum complications and her blood pressure improved back to normal. Patient does breastfeed her child.    In the ED the patient was afebrile, HR of 69, BP of 125/84, saturating 99% on room air.  Patient was treated with Ofirmev and 1 L IVF. Labs significant for leukocytosis to 13 with neutrophil predominance, mild alk phos, mild AST elevation, mild direct bilirubin elevation. RUQ US ordered showing. 1. Distended gallbladder with multiple mobile stones. No pericholecystic fluid, wall thickening, or Marte's sign. Findings are equivocal for acute cholecystitis. 2. Dilated common bile duct, 7mm. Recommend further evaluation with MRCP to evaluate for choledocholithiasis. 3. Hepatomegaly. (04 Aug 2023 23:16)    SURGERY ADDENDUM:  27F PMHx YAIR, known hepatomegaly  induced 1 month ago for gestational hypertension presents w/ 1-day history of abdominal pain associated w/ nausea and 1 episode of NBNB, admitted to medicine for US findings equivocal for acute cholecystitis. General surgery consulted for r/o acute cholecystitis w/ choledocholithiasis. Patient states epigastric started 1 day ago after ingesting fatty meal, experienced intermittent nausea and 1 episode of NBNB emesis. Continues to pass gas and have bowel movements at baseline. Denies fevers/chills, melena/hematochezia, cp, sob. Denies having similar symptoms in the past.    Last EGD within last 5 years w/ findings of shallow ulcers vs. gastritis no follow up. Denies hx of colonoscopy.    Denies family history of Crohn's, UC, colon cancer, or IBS.    On exam, patient is afebrile, nontachycardic, normotensive. Abdomen, nondistended nontender. Labs show 13.79, H/H 13/39, chemistry unremarkable, T bili 0.8, AST/ALT 68/36, Alk Phos 123. RUQ demonstrates distended gallbladder with multiple mobile stones, no pericholecystic   fluid, wall thickening, or Marte's sign; findings are equivocal foracute cholecystitis; dilated common bile duct, 7mm. Recommend further evaluation with MRCP to evaluate for choledocholithiasis; hepatomegaly.        MEDICATIONS  (STANDING):  cefTRIAXone   IVPB 1000 milliGRAM(s) IV Intermittent every 24 hours  ferrous    sulfate 325 milliGRAM(s) Oral <User Schedule>  metroNIDAZOLE  IVPB 500 milliGRAM(s) IV Intermittent every 8 hours  metroNIDAZOLE  IVPB      prenatal multivitamin 1 Tablet(s) Oral daily    MEDICATIONS  (PRN):  acetaminophen     Tablet .. 650 milliGRAM(s) Oral every 6 hours PRN Temp greater or equal to 38C (100.4F), Mild Pain (1 - 3)  melatonin 3 milliGRAM(s) Oral at bedtime PRN Insomnia  ondansetron Injectable 4 milliGRAM(s) IV Push every 8 hours PRN Nausea and/or Vomiting      Allergies    No Known Allergies    Intolerances        SOCIAL HISTORY:    FAMILY HISTORY:      Vital Signs Last 24 Hrs  T(C): 36.7 (05 Aug 2023 00:16), Max: 37.1 (04 Aug 2023 21:10)  T(F): 98.1 (05 Aug 2023 00:16), Max: 98.7 (04 Aug 2023 21:10)  HR: 72 (05 Aug 2023 00:16) (62 - 72)  BP: 136/86 (05 Aug 2023 00:16) (125/84 - 136/86)  BP(mean): --  RR: 16 (05 Aug 2023 00:16) (16 - 18)  SpO2: 96% (05 Aug 2023 00:16) (96% - 99%)    Parameters below as of 05 Aug 2023 00:16  Patient On (Oxygen Delivery Method): room air        PHYSICAL EXAM:   Gen: NAD, resting comfortably   CV: NSR  Pulm: no respiratory distress on RA, CTAB   Abd: soft, ND, NTTP, no rebound or guarding   Ext: WWP, no edema   Neuro: motor/sensory grossly intact     LABS:                        13.2   13.79 )-----------( 285      ( 04 Aug 2023 22:04 )             39.0     08-04    140  |  103  |  11  ----------------------------<  142<H>  4.0   |  25  |  0.58    Ca    9.0      04 Aug 2023 22:04    TPro  7.3  /  Alb  4.4  /  TBili  0.8  /  DBili  0.4<H>  /  AST  68<H>  /  ALT  36  /  AlkPhos  123<H>  08-    LIVER FUNCTIONS - ( 04 Aug 2023 22:04 )  Alb: 4.4 g/dL / Pro: 7.3 g/dL / ALK PHOS: 123 U/L / ALT: 36 U/L / AST: 68 U/L / GGT: x                 CAPILLARY BLOOD GLUCOSE        Urinalysis Basic - ( 04 Aug 2023 22:05 )    Color: Yellow / Appearance: SL Cloudy / S.020 / pH: x  Gluc: x / Ketone: Trace mg/dL  / Bili: Small / Urobili: 1.0 E.U./dL   Blood: x / Protein: Trace mg/dL / Nitrite: NEGATIVE   Leuk Esterase: Trace / RBC: < 5 /HPF / WBC < 5 /HPF   Sq Epi: x / Non Sq Epi: x / Bacteria: Present /HPF        Urinalysis with Rflx Culture (collected 04 Aug 2023 22:05)         RADIOLOGY & ADDITIONAL STUDIES:    Plan discussed with attending and chief resident.   ____________________________________________________  [facility] - Resident   Sur[facility]

## 2023-08-05 NOTE — CONSULT NOTE ADULT - SUBJECTIVE AND OBJECTIVE BOX
GASTROENTEROLOGY CONSULT NOTE  HPI: 26 yo F,  , induced vaginal delivery course complicated by gestational hypertension one month ago, pw RUQ abdominal pain , aw nausea, admitted for gallbladder distention to rule out choledocholithiasis     Patient reports that around day of admission developed 8/10 intermittent crampy RUQ abdominal pain which improved to intermittent 6/10 pain.   Associated with moderate nausea and mild pleuritic back pain leading to shortness of breath.   Denies abdominal surgery, history of gallstones, liver disease, or kidney stones. Patient denies fevers, chills, chest pain, shortness of breath, flank pain, constipation, diarrhea, vomiting, dysuria.    Patient had no postpartum complications and her blood pressure improved back to normal. Patient does breastfeed her child.    GI consulted for elevated LT's    Allergies    No Known Allergies    Intolerances      Home Medications:  ferrous sulfate 325 mg (65 mg elemental iron) oral tablet: 1 tab(s) orally every other day (04 Aug 2023 23:50)  PNV Select oral tablet: 1 tab(s) orally once a day (04 Aug 2023 23:50)    MEDICATIONS:  MEDICATIONS  (STANDING):  cefTRIAXone   IVPB 1000 milliGRAM(s) IV Intermittent every 24 hours  ferrous    sulfate 325 milliGRAM(s) Oral <User Schedule>  metroNIDAZOLE  IVPB 500 milliGRAM(s) IV Intermittent every 8 hours  metroNIDAZOLE  IVPB      prenatal multivitamin 1 Tablet(s) Oral daily    MEDICATIONS  (PRN):  acetaminophen     Tablet .. 650 milliGRAM(s) Oral every 6 hours PRN Temp greater or equal to 38C (100.4F), Mild Pain (1 - 3)  melatonin 3 milliGRAM(s) Oral at bedtime PRN Insomnia  ondansetron Injectable 4 milliGRAM(s) IV Push every 8 hours PRN Nausea and/or Vomiting    PAST MEDICAL & SURGICAL HISTORY:  Gestational hypertension      Vaginal delivery        FAMILY HISTORY: htn    SOCIAL HISTORY:  denies toxic habits    REVIEW OF SYSTEMS:  All other 10 review of systems is negative unless indicated above.    Vital Signs Last 24 Hrs  T(C): 36.9 (05 Aug 2023 09:21), Max: 37.1 (04 Aug 2023 21:10)  T(F): 98.4 (05 Aug 2023 09:21), Max: 98.7 (04 Aug 2023 21:10)  HR: 69 (05 Aug 2023 09:21) (62 - 72)  BP: 113/77 (05 Aug 2023 09:21) (109/74 - 136/86)  BP(mean): --  RR: 16 (05 Aug 2023 09:21) (16 - 18)  SpO2: 98% (05 Aug 2023 09:21) (96% - 99%)    Parameters below as of 05 Aug 2023 09:21  Patient On (Oxygen Delivery Method): room air          PHYSICAL EXAM:    General: lying in bed, in no acute distress  HEENT: Neck supple, mmm, no jvd  Lungs: Normal respiratory effort, no intercostal retractions  Cardiovascular: regular rate  Abdomen: Soft, non-tender non-distended; No rebound or guarding  Extremities: wwp, no cce  Neurological: MOORE, speech fluent  Skin: Warm and dry. No obvious rash      LABS:                        12.9   8.87  )-----------( 260      ( 05 Aug 2023 06:20 )             37.4     08-05    138  |  105  |  8   ----------------------------<  109<H>  4.2   |  24  |  0.54    Ca    8.4      05 Aug 2023 06:20  Phos  4.2     08-05  Mg     1.8     08-05    TPro  6.4  /  Alb  3.8  /  TBili  1.3<H>  /  DBili  0.6<H>  /  AST  256<H>  /  ALT  145<H>  /  AlkPhos  124<H>  08-05            Urinalysis with Rflx Culture (collected 04 Aug 2023 22:05)      RADIOLOGY & ADDITIONAL STUDIES:     Reviewed

## 2023-08-05 NOTE — PROGRESS NOTE ADULT - PROBLEM SELECTOR PLAN 3
QTC noted to be mildly prolonged at 486, noted to have mild bradycardia.     - Care with QT prolonging agents.   - Continue to monitor. No postpartum complications, patient continues to breastfeed.

## 2023-08-05 NOTE — PROGRESS NOTE ADULT - PROBLEM SELECTOR PLAN 1
Presents with several hours of intermittent crampy RUQ abdominal pain. RUQ US shows CBD dilation to 7mm and gallbladder distention with multiple visualized mobile stones, associated leukocytosis leading to intermediate likelihood of choledocholithiasis with concern for developing cholecystitis leading to admission for inpatient workup. On examination negative Marte's sign and no fevers, and no jaundice leading to low concern for cholangitis. Lipase low and no epigastric pain so low concern for pancreatitis.     - Surgery recs appreciated. Pending MRCP   - Start Ceftriaxone and Flagyl.   - Serial Abdominal Exam.  - GI consulted   - Pain control with Tylenol/Toradol PRN. r/o acute cholecystitis   Presents with several hours of intermittent crampy RUQ abdominal pain. RUQ US shows CBD dilation to 7mm and gallbladder distention with multiple visualized mobile stones, associated leukocytosis leading to intermediate likelihood of choledocholithiasis with concern for developing cholecystitis leading to admission for inpatient workup. On examination negative Marte's sign and no fevers, and no jaundice leading to low concern for cholangitis. Lipase low and no epigastric pain so low concern for pancreatitis.     - Surgery recs appreciated. Pending MRCP   - Start Ceftriaxone and Flagyl.   - Serial Abdominal Exam.  - GI consulted   - Pain control with Tylenol/Toradol PRN.

## 2023-08-05 NOTE — PROGRESS NOTE ADULT - PROBLEM SELECTOR PLAN 2
No postpartum complications, patient continues to breastfeed. elevated AST/ALT   suspected 2.2 GB stone   Bilirubin trended up 1.3   pending mrcp and plan as above

## 2023-08-05 NOTE — CONSULT NOTE ADULT - ASSESSMENT
28 yo F,  , induced vaginal delivery course complicated by gestational hypertension one month ago, pw RUQ abdominal pain , aw nausea, admitted for gallbladder distention to rule out choledocholithiasis     notably bilirubin is split indirect / direct    Recommendations:  f/u MRCP  Trend Liver enzymes  fractionate bilirubin  Please send Viral Hepatitis Panel - including Hep B surface Ab and Core Ab Total  Abx per primary     Thank you for the courtesy of this consult. We will follow along with you.    Khalif Yoder M.D.  Gastroenterology Fellow  Weekday 7am-5pm Pager: [phone]  Weeknights/Weekend/Holiday Coverage: Please Call the  for contact info  Follow Up Questions welcome via [place] [employer] Teams Messenger
27F PMHx YAIR, known hepatomegaly  induced 1 month ago for gestational hypertension presents w/ 1-day history of abdominal pain associated w/ nausea and 1 episode of NBNB, admitted to medicine for US findings equivocal for acute cholecystitis. General surgery consulted for r/o acute cholecystitis w/ choledocholithiasis. Patient is afebrile, nontachycardic, normotensive. Abdomen, nondistended nontender. Labs show 13.79, H/H 13/39, chemistry unremarkable, T bili 0.8, AST/ALT 68/36, Alk Phos 123. RUQ demonstrates distended gallbladder with multiple mobile stones, no pericholecystic fluid, wall thickening, or Marte's sign; findings are equivocal foracute cholecystitis; dilated common bile duct, 7mm. Recommend further evaluation with MRCP to evaluate for choledocholithiasis; hepatomegaly.      Agree with MRCP  Continue antibiotics   Can trial PPI in setting of GERD hx and recurrent epigastric pain  OR plan pending further workup  Pain and nausea control per primary team  Rest of care per primary team

## 2023-08-05 NOTE — CONSULT NOTE ADULT - TIME BILLING
evaluation and management of acute cholecystitis vs. choledocholithiasis, review of labs and imaging, discussion with consultants, discussion with patient, documentation

## 2023-08-05 NOTE — CONSULT NOTE ADULT - SUBJECTIVE AND OBJECTIVE BOX
HPI:  Ms. Matt is a 27 year old female  with induced vaginal delivery course complicated by gestational hypertension one month prior who presents to the ED with RUQ abdominal pain for the past 5 hours associated with nausea, admitted for gallbladder distention to rule out choledocholithiasis     Patient reports that around 4pm this afternoon she developed 8/10 intermittent crampy RUQ abdominal pain which has improved to intermittent 6/10 pain. Associated with moderate nausea and mild pleuritic back pain leading to shortness of breath. Denies abdominal surgery, history of gallstones, liver disease, or kidney stones. Patient denies fevers, chills, chest pain, shortness of breath, flank pain, constipation, diarrhea, vomiting, dysuria.      Patient had no postpartum complications and her blood pressure improved back to normal. Patient does breastfeed her child.    In the ED the patient was afebrile, HR of 69, BP of 125/84, saturating 99% on room air.  Patient was treated with Ofirmev and 1 L IVF. Labs significant for leukocytosis to 13 with neutrophil predominance, mild alk phos, mild AST elevation, mild direct bilirubin elevation. RUQ US ordered showing. 1. Distended gallbladder with multiple mobile stones. No pericholecystic fluid, wall thickening, or Marte's sign. Findings are equivocal for acute cholecystitis. 2. Dilated common bile duct, 7mm. Recommend further evaluation with MRCP to evaluate for choledocholithiasis. 3. Hepatomegaly. (04 Aug 2023 23:16)    SURGERY ADDENDUM:  27F PMHx YAIR, known hepatomegaly  induced 1 month ago for gestational hypertension presents w/ 1-day history of abdominal pain associated w/ nausea and 1 episode of NBNB, admitted to medicine for US findings equivocal for acute cholecystitis. General surgery consulted for r/o acute cholecystitis w/ choledocholithiasis. Patient states epigastric started 1 day ago after ingesting fatty meal, experienced intermittent nausea and 1 episode of NBNB emesis. Continues to pass gas and have bowel movements at baseline. Denies fevers/chills, melena/hematochezia, cp, sob. Denies having similar symptoms in the past.    Last EGD within last 5 years w/ findings of shallow ulcers vs. gastritis no follow up. Denies hx of colonoscopy.    Denies family history of Crohn's, UC, colon cancer, or IBS.    On exam, patient is afebrile, nontachycardic, normotensive. Abdomen, nondistended nontender. Labs show 13.79, H/H 13/39, chemistry unremarkable, T bili 0.8, AST/ALT 68/36, Alk Phos 123. RUQ demonstrates distended gallbladder with multiple mobile stones, no pericholecystic   fluid, wall thickening, or Marte's sign; findings are equivocal foracute cholecystitis; dilated common bile duct, 7mm. Recommend further evaluation with MRCP to evaluate for choledocholithiasis; hepatomegaly.      MEDICATIONS  (STANDING):  cefTRIAXone   IVPB 1000 milliGRAM(s) IV Intermittent every 24 hours  ferrous    sulfate 325 milliGRAM(s) Oral <User Schedule>  metroNIDAZOLE  IVPB 500 milliGRAM(s) IV Intermittent every 8 hours  metroNIDAZOLE  IVPB      prenatal multivitamin 1 Tablet(s) Oral daily    MEDICATIONS  (PRN):  acetaminophen     Tablet .. 650 milliGRAM(s) Oral every 6 hours PRN Temp greater or equal to 38C (100.4F), Mild Pain (1 - 3)  melatonin 3 milliGRAM(s) Oral at bedtime PRN Insomnia  ondansetron Injectable 4 milliGRAM(s) IV Push every 8 hours PRN Nausea and/or Vomiting      Allergies    No Known Allergies    Intolerances        SOCIAL HISTORY:    FAMILY HISTORY:      Vital Signs Last 24 Hrs  T(C): 36.8 (05 Aug 2023 05:40), Max: 37.1 (04 Aug 2023 21:10)  T(F): 98.2 (05 Aug 2023 05:40), Max: 98.7 (04 Aug 2023 21:10)  HR: 63 (05 Aug 2023 05:40) (62 - 72)  BP: 109/74 (05 Aug 2023 05:40) (109/74 - 136/86)  BP(mean): --  RR: 16 (05 Aug 2023 05:40) (16 - 18)  SpO2: 97% (05 Aug 2023 05:40) (96% - 99%)    Parameters below as of 05 Aug 2023 05:40  Patient On (Oxygen Delivery Method): room air        PHYSICAL EXAM:   Gen: NAD, resting comfortably   CV: NSR  Pulm: no respiratory distress on RA, CTAB   Abd: soft, ND, NTTP, no rebound or guarding   Ext: WWP, no edema   Neuro: motor/sensory grossly intact     LABS:                        13.2   13.79 )-----------( 285      ( 04 Aug 2023 22:04 )             39.0     08-04    140  |  103  |  11  ----------------------------<  142<H>  4.0   |  25  |  0.58    Ca    9.0      04 Aug 2023 22:04    TPro  7.3  /  Alb  4.4  /  TBili  0.8  /  DBili  0.4<H>  /  AST  68<H>  /  ALT  36  /  AlkPhos  123<H>  08-    LIVER FUNCTIONS - ( 04 Aug 2023 22:04 )  Alb: 4.4 g/dL / Pro: 7.3 g/dL / ALK PHOS: 123 U/L / ALT: 36 U/L / AST: 68 U/L / GGT: x                 CAPILLARY BLOOD GLUCOSE        Urinalysis Basic - ( 04 Aug 2023 22:05 )    Color: Yellow / Appearance: SL Cloudy / S.020 / pH: x  Gluc: x / Ketone: Trace mg/dL  / Bili: Small / Urobili: 1.0 E.U./dL   Blood: x / Protein: Trace mg/dL / Nitrite: NEGATIVE   Leuk Esterase: Trace / RBC: < 5 /HPF / WBC < 5 /HPF   Sq Epi: x / Non Sq Epi: x / Bacteria: Present /HPF        Urinalysis with Rflx Culture (collected 04 Aug 2023 22:05)         RADIOLOGY & ADDITIONAL STUDIES:    Plan discussed with attending and chief resident.   ____________________________________________________  [facility] - Resident   Surgery

## 2023-08-06 ENCOUNTER — TRANSCRIPTION ENCOUNTER (OUTPATIENT)
Age: 28
End: 2023-08-06

## 2023-08-06 LAB
ALBUMIN SERPL ELPH-MCNC: 3.6 G/DL — SIGNIFICANT CHANGE UP (ref 3.3–5)
ALP SERPL-CCNC: 144 U/L — HIGH (ref 40–120)
ALT FLD-CCNC: 277 U/L — HIGH (ref 10–45)
ANION GAP SERPL CALC-SCNC: 8 MMOL/L — SIGNIFICANT CHANGE UP (ref 5–17)
APTT BLD: 35.7 SEC — HIGH (ref 24.5–35.6)
AST SERPL-CCNC: 190 U/L — HIGH (ref 10–40)
BILIRUB SERPL-MCNC: 0.5 MG/DL — SIGNIFICANT CHANGE UP (ref 0.2–1.2)
BLD GP AB SCN SERPL QL: NEGATIVE — SIGNIFICANT CHANGE UP
BUN SERPL-MCNC: 7 MG/DL — SIGNIFICANT CHANGE UP (ref 7–23)
CALCIUM SERPL-MCNC: 8.7 MG/DL — SIGNIFICANT CHANGE UP (ref 8.4–10.5)
CHLORIDE SERPL-SCNC: 105 MMOL/L — SIGNIFICANT CHANGE UP (ref 96–108)
CO2 SERPL-SCNC: 25 MMOL/L — SIGNIFICANT CHANGE UP (ref 22–31)
CREAT SERPL-MCNC: 0.59 MG/DL — SIGNIFICANT CHANGE UP (ref 0.5–1.3)
EGFR: 125 ML/MIN/1.73M2 — SIGNIFICANT CHANGE UP
GLUCOSE SERPL-MCNC: 84 MG/DL — SIGNIFICANT CHANGE UP (ref 70–99)
HAV IGM SER-ACNC: SIGNIFICANT CHANGE UP
HBV CORE AB SER-ACNC: SIGNIFICANT CHANGE UP
HBV CORE IGM SER-ACNC: SIGNIFICANT CHANGE UP
HBV SURFACE AB SER-ACNC: REACTIVE
HBV SURFACE AG SER-ACNC: SIGNIFICANT CHANGE UP
HCT VFR BLD CALC: 38.3 % — SIGNIFICANT CHANGE UP (ref 34.5–45)
HCV AB S/CO SERPL IA: 0.04 S/CO — SIGNIFICANT CHANGE UP
HCV AB SERPL-IMP: SIGNIFICANT CHANGE UP
HGB BLD-MCNC: 12.5 G/DL — SIGNIFICANT CHANGE UP (ref 11.5–15.5)
INR BLD: 0.9 — SIGNIFICANT CHANGE UP (ref 0.85–1.18)
MAGNESIUM SERPL-MCNC: 1.8 MG/DL — SIGNIFICANT CHANGE UP (ref 1.6–2.6)
MCHC RBC-ENTMCNC: 28.3 PG — SIGNIFICANT CHANGE UP (ref 27–34)
MCHC RBC-ENTMCNC: 32.6 GM/DL — SIGNIFICANT CHANGE UP (ref 32–36)
MCV RBC AUTO: 86.8 FL — SIGNIFICANT CHANGE UP (ref 80–100)
NRBC # BLD: 0 /100 WBCS — SIGNIFICANT CHANGE UP (ref 0–0)
PHOSPHATE SERPL-MCNC: 3.5 MG/DL — SIGNIFICANT CHANGE UP (ref 2.5–4.5)
PLATELET # BLD AUTO: 239 K/UL — SIGNIFICANT CHANGE UP (ref 150–400)
POTASSIUM SERPL-MCNC: 3.9 MMOL/L — SIGNIFICANT CHANGE UP (ref 3.5–5.3)
POTASSIUM SERPL-SCNC: 3.9 MMOL/L — SIGNIFICANT CHANGE UP (ref 3.5–5.3)
PROT SERPL-MCNC: 6.3 G/DL — SIGNIFICANT CHANGE UP (ref 6–8.3)
PROTHROM AB SERPL-ACNC: 10.3 SEC — SIGNIFICANT CHANGE UP (ref 9.5–13)
RBC # BLD: 4.41 M/UL — SIGNIFICANT CHANGE UP (ref 3.8–5.2)
RBC # FLD: 12.6 % — SIGNIFICANT CHANGE UP (ref 10.3–14.5)
RH IG SCN BLD-IMP: POSITIVE — SIGNIFICANT CHANGE UP
SODIUM SERPL-SCNC: 138 MMOL/L — SIGNIFICANT CHANGE UP (ref 135–145)
WBC # BLD: 7.91 K/UL — SIGNIFICANT CHANGE UP (ref 3.8–10.5)
WBC # FLD AUTO: 7.91 K/UL — SIGNIFICANT CHANGE UP (ref 3.8–10.5)

## 2023-08-06 PROCEDURE — 99232 SBSQ HOSP IP/OBS MODERATE 35: CPT | Mod: 57

## 2023-08-06 PROCEDURE — 99233 SBSQ HOSP IP/OBS HIGH 50: CPT | Mod: GC

## 2023-08-06 RX ORDER — SODIUM CHLORIDE 9 MG/ML
1000 INJECTION, SOLUTION INTRAVENOUS
Refills: 0 | Status: DISCONTINUED | OUTPATIENT
Start: 2023-08-06 | End: 2023-08-07

## 2023-08-06 RX ADMIN — Medication 100 MILLIGRAM(S): at 01:01

## 2023-08-06 RX ADMIN — CEFTRIAXONE 100 MILLIGRAM(S): 500 INJECTION, POWDER, FOR SOLUTION INTRAMUSCULAR; INTRAVENOUS at 01:01

## 2023-08-06 RX ADMIN — Medication 100 MILLIGRAM(S): at 08:58

## 2023-08-06 RX ADMIN — Medication 1 TABLET(S): at 12:42

## 2023-08-06 RX ADMIN — SODIUM CHLORIDE 120 MILLILITER(S): 9 INJECTION, SOLUTION INTRAVENOUS at 23:06

## 2023-08-06 RX ADMIN — Medication 100 MILLIGRAM(S): at 23:48

## 2023-08-06 RX ADMIN — Medication 100 MILLIGRAM(S): at 16:03

## 2023-08-06 NOTE — PROGRESS NOTE ADULT - SUBJECTIVE AND OBJECTIVE BOX
Internal Medicine Progress Note    OVERNIGHT EVENTS/INTERVAL HPI: No acute events overnight. Patient seen at bedside this morning. No significant complaints. Denies chest pain, SOB, abdominal pain, urinary symptoms, N/V/D.    OBJECTIVE:  Vital Signs Last 24 Hrs  T(C): 36.8 (06 Aug 2023 09:07), Max: 36.9 (05 Aug 2023 15:59)  T(F): 98.3 (06 Aug 2023 09:07), Max: 98.5 (05 Aug 2023 20:43)  HR: 68 (06 Aug 2023 09:07) (54 - 68)  BP: 117/82 (06 Aug 2023 09:07) (107/69 - 130/84)  BP(mean): 93 (06 Aug 2023 09:07) (93 - 93)  RR: 18 (06 Aug 2023 09:07) (16 - 18)  SpO2: 98% (06 Aug 2023 09:07) (97% - 99%)    Parameters below as of 06 Aug 2023 09:07  Patient On (Oxygen Delivery Method): room air      I&O's Detail    Physical Exam:  GENERAL: Awake, alert and interactive, no acute distress, appears comfortable  NEURO: A&Ox3, no focal deficits, 5/5 strength in all ext  HEENT: Normocephalic, atraumatic, no conjunctivitis or scleral icterus  NECK: Supple, no LAD, no JVD  CARDIAC: Regular rate and rhythm, +S1/S2, no murmurs/rubs/gallops  PULM: Breathing comfortably on RA, clear to auscultation bilaterally, no wheezes/rales/rhonchi  ABDOMEN: Soft, nontender, nondistended, no rebound tenderness, No RUQ pain, Marte's sign negative  : Deferred  MSK: Range of motion grossly intact, no back tenderness  SKIN: Warm and dry, no rashes, lesions  VASC: 2+ peripheral pulses, no edema, no LE tenderness  Psych: Appropriate affect    Medications:  MEDICATIONS  (STANDING):  cefTRIAXone   IVPB 1000 milliGRAM(s) IV Intermittent every 24 hours  ferrous    sulfate 325 milliGRAM(s) Oral <User Schedule>  lactated ringers. 1000 milliLiter(s) (120 mL/Hr) IV Continuous <Continuous>  metroNIDAZOLE  IVPB 500 milliGRAM(s) IV Intermittent every 8 hours  metroNIDAZOLE  IVPB      prenatal multivitamin 1 Tablet(s) Oral daily    MEDICATIONS  (PRN):  acetaminophen     Tablet .. 650 milliGRAM(s) Oral every 6 hours PRN Temp greater or equal to 38C (100.4F), Mild Pain (1 - 3)  melatonin 3 milliGRAM(s) Oral at bedtime PRN Insomnia  ondansetron Injectable 4 milliGRAM(s) IV Push every 8 hours PRN Nausea and/or Vomiting      Labs:                        12.5   7.91  )-----------( 239      ( 06 Aug 2023 05:30 )             38.3     08-06    138  |  105  |  7   ----------------------------<  84  3.9   |  25  |  0.59    Ca    8.7      06 Aug 2023 05:30  Phos  3.5     08-06  Mg     1.8     08-06    TPro  6.3  /  Alb  3.6  /  TBili  0.5  /  DBili  x   /  AST  190<H>  /  ALT  277<H>  /  AlkPhos  144<H>  08-06    PT/INR - ( 06 Aug 2023 05:30 )   PT: 10.3 sec;   INR: 0.90          PTT - ( 06 Aug 2023 05:30 )  PTT:35.7 sec    Urinalysis Basic - ( 06 Aug 2023 05:30 )    Color: x / Appearance: x / SG: x / pH: x  Gluc: 84 mg/dL / Ketone: x  / Bili: x / Urobili: x   Blood: x / Protein: x / Nitrite: x   Leuk Esterase: x / RBC: x / WBC x   Sq Epi: x / Non Sq Epi: x / Bacteria: x          Radiology: Reviewed ****TRANSFER FROM [facility] TO SURGERY***    HOSPITAL COURSE:   Ms. Matt is a 27 year old female  with induced vaginal delivery course complicated by gestational hypertension one month prior who presents to the ED with RUQ abdominal pain for the past 5 hours associated with nausea, admitted for gallbladder distention to rule out choledocholithiasis. Denies abdominal surgery, history of gallstones, liver disease, or kidney stones. Patient denies fevers, chills, chest pain, shortness of breath, flank pain, constipation, diarrhea, vomiting, dysuria. Patient had no postpartum complications and her blood pressure improved back to normal. Patient does breastfeed her child.    RUQ U/S:  Distended gallbladder with multiple mobile stones. No pericholecystic fluid, wall thickening, or Marte's sign. Findings are equivocal for acute cholecystitis. Dilated common bile duct, 7mm. Hepatomegaly.   MRCP: Evidence of gallbladder thickening  developed since prior sonogram. No evidence of choledocholithiasis or biliary ductal dilatation.      OVERNIGHT EVENTS/INTERVAL HPI: No acute events overnight. Patient seen at bedside this morning. No significant complaints. Denies chest pain, SOB, abdominal pain, urinary symptoms, N/V/D. MRCP done       OBJECTIVE:  Vital Signs Last 24 Hrs  T(C): 36.8 (06 Aug 2023 09:07), Max: 36.9 (05 Aug 2023 15:59)  T(F): 98.3 (06 Aug 2023 09:07), Max: 98.5 (05 Aug 2023 20:43)  HR: 68 (06 Aug 2023 09:07) (54 - 68)  BP: 117/82 (06 Aug 2023 09:07) (107/69 - 130/84)  BP(mean): 93 (06 Aug 2023 09:07) (93 - 93)  RR: 18 (06 Aug 2023 09:07) (16 - 18)  SpO2: 98% (06 Aug 2023 09:07) (97% - 99%)    Parameters below as of 06 Aug 2023 09:07  Patient On (Oxygen Delivery Method): room air      I&O's Detail    Physical Exam:  GENERAL: Awake, alert and interactive, no acute distress, appears comfortable  NEURO: A&Ox3, no focal deficits, 5/5 strength in all ext  HEENT: Normocephalic, atraumatic, no conjunctivitis or scleral icterus  NECK: Supple, no LAD, no JVD  CARDIAC: Regular rate and rhythm, +S1/S2, no murmurs/rubs/gallops  PULM: Breathing comfortably on RA, clear to auscultation bilaterally, no wheezes/rales/rhonchi  ABDOMEN: Soft, nontender, nondistended, no rebound tenderness, No RUQ pain, Marte's sign negative  : Deferred  MSK: Range of motion grossly intact, no back tenderness  SKIN: Warm and dry, no rashes, lesions  VASC: 2+ peripheral pulses, no edema, no LE tenderness  Psych: Appropriate affect    Medications:  MEDICATIONS  (STANDING):  cefTRIAXone   IVPB 1000 milliGRAM(s) IV Intermittent every 24 hours  ferrous    sulfate 325 milliGRAM(s) Oral <User Schedule>  lactated ringers. 1000 milliLiter(s) (120 mL/Hr) IV Continuous <Continuous>  metroNIDAZOLE  IVPB 500 milliGRAM(s) IV Intermittent every 8 hours  metroNIDAZOLE  IVPB      prenatal multivitamin 1 Tablet(s) Oral daily    MEDICATIONS  (PRN):  acetaminophen     Tablet .. 650 milliGRAM(s) Oral every 6 hours PRN Temp greater or equal to 38C (100.4F), Mild Pain (1 - 3)  melatonin 3 milliGRAM(s) Oral at bedtime PRN Insomnia  ondansetron Injectable 4 milliGRAM(s) IV Push every 8 hours PRN Nausea and/or Vomiting      Labs:                        12.5   7.91  )-----------( 239      ( 06 Aug 2023 05:30 )             38.3     08-06    138  |  105  |  7   ----------------------------<  84  3.9   |  25  |  0.59    Ca    8.7      06 Aug 2023 05:30  Phos  3.5     08-06  Mg     1.8     08-06    TPro  6.3  /  Alb  3.6  /  TBili  0.5  /  DBili  x   /  AST  190<H>  /  ALT  277<H>  /  AlkPhos  144<H>  08-06    PT/INR - ( 06 Aug 2023 05:30 )   PT: 10.3 sec;   INR: 0.90          PTT - ( 06 Aug 2023 05:30 )  PTT:35.7 sec    Urinalysis Basic - ( 06 Aug 2023 05:30 )    Color: x / Appearance: x / SG: x / pH: x  Gluc: 84 mg/dL / Ketone: x  / Bili: x / Urobili: x   Blood: x / Protein: x / Nitrite: x   Leuk Esterase: x / RBC: x / WBC x   Sq Epi: x / Non Sq Epi: x / Bacteria: x          Radiology: Reviewed

## 2023-08-06 NOTE — PROGRESS NOTE ADULT - PROBLEM SELECTOR PLAN 2
elevated AST/ALT   suspected 2.2 GB stone   Bilirubin trended up 1.3   pending mrcp and plan as above elevated AST/ALT   suspected 2.2 GB stone   Bilirubin trended down to 0.5 from 1.3

## 2023-08-06 NOTE — PROGRESS NOTE ADULT - PROBLEM SELECTOR PLAN 5
F: 1L NS in ED.   E: Replete PRN.   N: NPO  DVT ppx: Low risk. holding   Disposition: F F: 1L NS in ED.   E: Replete PRN.   N: Clear liquid diet, NPO midnight  DVT ppx: Low risk. holding   Disposition: RMF to surgery

## 2023-08-06 NOTE — CHART NOTE - NSCHARTNOTEFT_GEN_A_CORE
MRCP reviewed  CBD 0.5 without Choledocho  Likely transient elevation of LT's in s/o passed stone  recommend to continue to trend Liver enzymes  no plan for ERCP at this juncture    Thank you for allowing us to participate in the care of this patient. GI will sign off the case.  Please call us if you have any further questions or concerns    Khalif Yoder M.D.  Gastroenterology Fellow  Weekday 7am-5pm Pager: [phone]  Weeknights/Weekend/Holiday Coverage: Please Call the  for contact info  Follow Up Questions welcome via [place] [employer] Teams Messenger

## 2023-08-06 NOTE — PROGRESS NOTE ADULT - PROBLEM SELECTOR PLAN 4
QTC noted to be mildly prolonged at 486, noted to have mild bradycardia.     - Care with QT prolonging agents.   - Continue to monitor. QTC noted to be mildly prolonged at 486, noted to have mild bradycardia.     - Care with QT prolonging agents.   - Continue to monitor.  - follow up EKG

## 2023-08-06 NOTE — PROGRESS NOTE ADULT - PROBLEM SELECTOR PLAN 1
Presents with several hours of intermittent crampy RUQ abdominal pain. RUQ US shows CBD dilation to 7mm and gallbladder distention with multiple visualized mobile stones, associated leukocytosis leading to intermediate likelihood of choledocholithiasis with concern for developing cholecystitis leading to admission for inpatient workup. On examination negative Marte's sign and no fevers, and no jaundice leading to low concern for cholangitis. Lipase low and no epigastric pain so low concern for pancreatitis.     - MRCP - mild fluid in gallbladder, without wall thickening, no bilary ductal dilatation or choledocholithiasis  - f/u Surgery recs - potential cholecystectomy  - c/w Ceftriaxone and Flagyl.   - Serial Abdominal Exam.  - GI consulted   - Pain control with Tylenol/Toradol PRN.

## 2023-08-06 NOTE — PROGRESS NOTE ADULT - SUBJECTIVE AND OBJECTIVE BOX
No acute events overnight. Pain controlled, no nausea or vomiting. No fevers, chills, chest pain, dyspnea, dysuria, hematochezia, melena.     Afebrile, hemodynamically stable, abdomen soft, mild RUQ tenderness, no rebound or guarding.    MRCP reviewed, no evidence of choledocholithiasis  Bilis unremarkable

## 2023-08-07 ENCOUNTER — RESULT REVIEW (OUTPATIENT)
Age: 28
End: 2023-08-07

## 2023-08-07 ENCOUNTER — TRANSCRIPTION ENCOUNTER (OUTPATIENT)
Age: 28
End: 2023-08-07

## 2023-08-07 LAB
ALBUMIN SERPL ELPH-MCNC: 3.6 G/DL — SIGNIFICANT CHANGE UP (ref 3.3–5)
ALP SERPL-CCNC: 120 U/L — SIGNIFICANT CHANGE UP (ref 40–120)
ALT FLD-CCNC: 165 U/L — HIGH (ref 10–45)
ANION GAP SERPL CALC-SCNC: 9 MMOL/L — SIGNIFICANT CHANGE UP (ref 5–17)
APTT BLD: 35.8 SEC — HIGH (ref 24.5–35.6)
AST SERPL-CCNC: 70 U/L — HIGH (ref 10–40)
BILIRUB DIRECT SERPL-MCNC: 0.2 MG/DL — SIGNIFICANT CHANGE UP (ref 0–0.3)
BILIRUB INDIRECT FLD-MCNC: 0.2 MG/DL — SIGNIFICANT CHANGE UP (ref 0.2–1)
BILIRUB SERPL-MCNC: 0.4 MG/DL — SIGNIFICANT CHANGE UP (ref 0.2–1.2)
BUN SERPL-MCNC: 3 MG/DL — LOW (ref 7–23)
CALCIUM SERPL-MCNC: 8.6 MG/DL — SIGNIFICANT CHANGE UP (ref 8.4–10.5)
CHLORIDE SERPL-SCNC: 107 MMOL/L — SIGNIFICANT CHANGE UP (ref 96–108)
CO2 SERPL-SCNC: 26 MMOL/L — SIGNIFICANT CHANGE UP (ref 22–31)
CREAT SERPL-MCNC: 0.53 MG/DL — SIGNIFICANT CHANGE UP (ref 0.5–1.3)
EGFR: 128 ML/MIN/1.73M2 — SIGNIFICANT CHANGE UP
GLUCOSE SERPL-MCNC: 87 MG/DL — SIGNIFICANT CHANGE UP (ref 70–99)
HCG SERPL-ACNC: 3 MIU/ML — SIGNIFICANT CHANGE UP
HCT VFR BLD CALC: 36.6 % — SIGNIFICANT CHANGE UP (ref 34.5–45)
HGB BLD-MCNC: 12 G/DL — SIGNIFICANT CHANGE UP (ref 11.5–15.5)
INR BLD: 0.9 — SIGNIFICANT CHANGE UP (ref 0.85–1.18)
MAGNESIUM SERPL-MCNC: 1.7 MG/DL — SIGNIFICANT CHANGE UP (ref 1.6–2.6)
MCHC RBC-ENTMCNC: 28.6 PG — SIGNIFICANT CHANGE UP (ref 27–34)
MCHC RBC-ENTMCNC: 32.8 GM/DL — SIGNIFICANT CHANGE UP (ref 32–36)
MCV RBC AUTO: 87.4 FL — SIGNIFICANT CHANGE UP (ref 80–100)
NRBC # BLD: 0 /100 WBCS — SIGNIFICANT CHANGE UP (ref 0–0)
PHOSPHATE SERPL-MCNC: 3.9 MG/DL — SIGNIFICANT CHANGE UP (ref 2.5–4.5)
PLATELET # BLD AUTO: 236 K/UL — SIGNIFICANT CHANGE UP (ref 150–400)
POTASSIUM SERPL-MCNC: 4 MMOL/L — SIGNIFICANT CHANGE UP (ref 3.5–5.3)
POTASSIUM SERPL-SCNC: 4 MMOL/L — SIGNIFICANT CHANGE UP (ref 3.5–5.3)
PROT SERPL-MCNC: 6 G/DL — SIGNIFICANT CHANGE UP (ref 6–8.3)
PROTHROM AB SERPL-ACNC: 10.3 SEC — SIGNIFICANT CHANGE UP (ref 9.5–13)
RBC # BLD: 4.19 M/UL — SIGNIFICANT CHANGE UP (ref 3.8–5.2)
RBC # FLD: 12.6 % — SIGNIFICANT CHANGE UP (ref 10.3–14.5)
SODIUM SERPL-SCNC: 142 MMOL/L — SIGNIFICANT CHANGE UP (ref 135–145)
WBC # BLD: 7.4 K/UL — SIGNIFICANT CHANGE UP (ref 3.8–10.5)
WBC # FLD AUTO: 7.4 K/UL — SIGNIFICANT CHANGE UP (ref 3.8–10.5)

## 2023-08-07 PROCEDURE — S2900 ROBOTIC SURGICAL SYSTEM: CPT | Mod: NC

## 2023-08-07 PROCEDURE — 47562 LAPAROSCOPIC CHOLECYSTECTOMY: CPT

## 2023-08-07 PROCEDURE — 88304 TISSUE EXAM BY PATHOLOGIST: CPT | Mod: 26

## 2023-08-07 PROCEDURE — 99232 SBSQ HOSP IP/OBS MODERATE 35: CPT

## 2023-08-07 DEVICE — LIGATING CLIPS WECK HEMOLOK POLYMER LARGE (PURPLE) 6: Type: IMPLANTABLE DEVICE | Status: FUNCTIONAL

## 2023-08-07 RX ORDER — SODIUM CHLORIDE 9 MG/ML
1000 INJECTION, SOLUTION INTRAVENOUS
Refills: 0 | Status: DISCONTINUED | OUTPATIENT
Start: 2023-08-07 | End: 2023-08-08

## 2023-08-07 RX ORDER — OXYCODONE HYDROCHLORIDE 5 MG/1
2.5 TABLET ORAL EVERY 4 HOURS
Refills: 0 | Status: DISCONTINUED | OUTPATIENT
Start: 2023-08-07 | End: 2023-08-08

## 2023-08-07 RX ORDER — ACETAMINOPHEN 500 MG
650 TABLET ORAL EVERY 8 HOURS
Refills: 0 | Status: DISCONTINUED | OUTPATIENT
Start: 2023-08-07 | End: 2023-08-08

## 2023-08-07 RX ORDER — OXYCODONE HYDROCHLORIDE 5 MG/1
5 TABLET ORAL EVERY 4 HOURS
Refills: 0 | Status: DISCONTINUED | OUTPATIENT
Start: 2023-08-07 | End: 2023-08-08

## 2023-08-07 RX ORDER — ONDANSETRON 8 MG/1
4 TABLET, FILM COATED ORAL EVERY 6 HOURS
Refills: 0 | Status: DISCONTINUED | OUTPATIENT
Start: 2023-08-07 | End: 2023-08-08

## 2023-08-07 RX ORDER — MAGNESIUM SULFATE 500 MG/ML
1 VIAL (ML) INJECTION ONCE
Refills: 0 | Status: COMPLETED | OUTPATIENT
Start: 2023-08-07 | End: 2023-08-07

## 2023-08-07 RX ORDER — ENOXAPARIN SODIUM 100 MG/ML
40 INJECTION SUBCUTANEOUS EVERY 24 HOURS
Refills: 0 | Status: DISCONTINUED | OUTPATIENT
Start: 2023-08-07 | End: 2023-08-08

## 2023-08-07 RX ADMIN — CEFTRIAXONE 100 MILLIGRAM(S): 500 INJECTION, POWDER, FOR SOLUTION INTRAMUSCULAR; INTRAVENOUS at 01:07

## 2023-08-07 RX ADMIN — Medication 650 MILLIGRAM(S): at 21:06

## 2023-08-07 RX ADMIN — Medication 100 GRAM(S): at 09:32

## 2023-08-07 RX ADMIN — Medication 100 MILLIGRAM(S): at 07:00

## 2023-08-07 NOTE — CHART NOTE - NSCHARTNOTEFT_GEN_A_CORE
Team 1 Surgery Post-Op Note, PCN:     Pre-Op Dx:   Procedure: Robot-assisted cholecystectomy      Surgeon:    Subjective:      Vital Signs Last 24 Hrs  T(C): 37.1 (07 Aug 2023 20:48), Max: 37.1 (07 Aug 2023 20:48)  T(F): 98.8 (07 Aug 2023 20:48), Max: 98.8 (07 Aug 2023 20:48)  HR: 82 (07 Aug 2023 20:48) (58 - 99)  BP: 126/81 (07 Aug 2023 20:48) (113/76 - 140/78)  BP(mean): 99 (07 Aug 2023 20:15) (88 - 103)  RR: 17 (07 Aug 2023 20:48) (13 - 20)  SpO2: 98% (07 Aug 2023 20:48) (96% - 100%)    Parameters below as of 07 Aug 2023 20:48  Patient On (Oxygen Delivery Method): room air        Physical Exam:  General: NAD, resting comfortably in bed  Pulmonary: Nonlabored breathing, no respiratory distress  Cardiovascular: NSR  Abdominal: soft, NT/ND  Extremities: WWP, normal strength  Neuro: A/O x 3, CNs II-XII grossly intact, no focal deficits, normal sensation  Pulses: palpable distal pulses      LABS:                        12.0   7.40  )-----------( 236      ( 07 Aug 2023 05:30 )             36.6     08-07    142  |  107  |  3<L>  ----------------------------<  87  4.0   |  26  |  0.53    Ca    8.6      07 Aug 2023 05:30  Phos  3.9     08-07  Mg     1.7     08-07    TPro  6.0  /  Alb  3.6  /  TBili  0.4  /  DBili  0.2  /  AST  70<H>  /  ALT  165<H>  /  AlkPhos  120  08-07    PT/INR - ( 07 Aug 2023 05:30 )   PT: 10.3 sec;   INR: 0.90          PTT - ( 07 Aug 2023 05:30 )  PTT:35.8 sec  CAPILLARY BLOOD GLUCOSE        Urinalysis Basic - ( 07 Aug 2023 05:30 )    Color: x / Appearance: x / SG: x / pH: x  Gluc: 87 mg/dL / Ketone: x  / Bili: x / Urobili: x   Blood: x / Protein: x / Nitrite: x   Leuk Esterase: x / RBC: x / WBC x   Sq Epi: x / Non Sq Epi: x / Bacteria: x      LIVER FUNCTIONS - ( 07 Aug 2023 05:30 )  Alb: 3.6 g/dL / Pro: 6.0 g/dL / ALK PHOS: 120 U/L / ALT: 165 U/L / AST: 70 U/L / GGT: x                 Radiology and Additional Studies:    Assessment:27y Female s/p above procedure    Plan:  Pain/nausea control PRN  Home meds  Incentive spirometer/OOB/Ambulate  IVF, Diet:  AM labs  ToV? [place]? Team 1 Surgery Post-Op Note, PCN:     Pre-Op Dx:   Procedure: Robot-assisted cholecystectomy      Surgeon: Dr. Fraga    Subjective: Patient was seen at bedside in the PACU. Patient is doing well post-operatively. Currently denying nausea and vomiting. Denies flatus or BM at this time. Reports soreness at umbilicus incision site otherwise denies pain.      Vital Signs Last 24 Hrs  T(C): 37.1 (07 Aug 2023 20:48), Max: 37.1 (07 Aug 2023 20:48)  T(F): 98.8 (07 Aug 2023 20:48), Max: 98.8 (07 Aug 2023 20:48)  HR: 82 (07 Aug 2023 20:48) (58 - 99)  BP: 126/81 (07 Aug 2023 20:48) (113/76 - 140/78)  BP(mean): 99 (07 Aug 2023 20:15) (88 - 103)  RR: 17 (07 Aug 2023 20:48) (13 - 20)  SpO2: 98% (07 Aug 2023 20:48) (96% - 100%)    Parameters below as of 07 Aug 2023 20:48  Patient On (Oxygen Delivery Method): room air        Physical Exam:  General: NAD, resting comfortably in bed  Pulmonary: Nonlabored breathing, no respiratory distress  Cardiovascular: NSR  Abdominal: soft, ND, appropriately tender at umbilicus incision. Port site incisions c/d/i with dermabond  Extremities: WWP, normal strength  Neuro: A/O x 3, CNs II-XII grossly intact, no focal deficits, normal sensation  Pulses: palpable distal pulses      LABS:                        12.0   7.40  )-----------( 236      ( 07 Aug 2023 05:30 )             36.6     08-07    142  |  107  |  3<L>  ----------------------------<  87  4.0   |  26  |  0.53    Ca    8.6      07 Aug 2023 05:30  Phos  3.9     08-07  Mg     1.7     08-07    TPro  6.0  /  Alb  3.6  /  TBili  0.4  /  DBili  0.2  /  AST  70<H>  /  ALT  165<H>  /  AlkPhos  120  08-07    PT/INR - ( 07 Aug 2023 05:30 )   PT: 10.3 sec;   INR: 0.90          PTT - ( 07 Aug 2023 05:30 )  PTT:35.8 sec  CAPILLARY BLOOD GLUCOSE        Urinalysis Basic - ( 07 Aug 2023 05:30 )    Color: x / Appearance: x / SG: x / pH: x  Gluc: 87 mg/dL / Ketone: x  / Bili: x / Urobili: x   Blood: x / Protein: x / Nitrite: x   Leuk Esterase: x / RBC: x / WBC x   Sq Epi: x / Non Sq Epi: x / Bacteria: x      LIVER FUNCTIONS - ( 07 Aug 2023 05:30 )  Alb: 3.6 g/dL / Pro: 6.0 g/dL / ALK PHOS: 120 U/L / ALT: 165 U/L / AST: 70 U/L / GGT: x                 Radiology and Additional Studies:    Assessment:27y Female s/p above procedure    Plan:  LFD  SCDs   Pain and nausea prn  Avoid QT prolonging meds  Trend LFTs w/ AM labs

## 2023-08-07 NOTE — PROGRESS NOTE ADULT - ATTENDING COMMENTS
Patient is a 27 year old woman with history of anemia, hepatomegaly, gestational hypertension, now 1 month post partum, now with symptomatic cholelithiasis vs. acute cholecystitis.     MRCP negative  Plan for OR today for robotic assisted cholecystectomy
acute cholecystitis: Distended gallbladder with multiple mobile stones and GB wall thickening per new addendum. Plan for OR 8/7 - pt will be transferred to ACS service   Pre-op medical optimization, METS >4 no need for further testing/imaging prior to OR. RCRI CLASS I - low risk for intermediate risk procedure   MRCP: CBD 0.5 , NO Choledocholithiasis   transaminitis: 2.2 GB stones

## 2023-08-07 NOTE — PROGRESS NOTE ADULT - SUBJECTIVE AND OBJECTIVE BOX
SUBJECTIVE:      MEDICATIONS  (STANDING):  cefTRIAXone   IVPB 1000 milliGRAM(s) IV Intermittent every 24 hours  lactated ringers. 1000 milliLiter(s) (120 mL/Hr) IV Continuous <Continuous>  metroNIDAZOLE  IVPB 500 milliGRAM(s) IV Intermittent every 8 hours  metroNIDAZOLE  IVPB      prenatal multivitamin 1 Tablet(s) Oral daily    MEDICATIONS  (PRN):  acetaminophen     Tablet .. 650 milliGRAM(s) Oral every 6 hours PRN Temp greater or equal to 38C (100.4F), Mild Pain (1 - 3)  melatonin 3 milliGRAM(s) Oral at bedtime PRN Insomnia  ondansetron Injectable 4 milliGRAM(s) IV Push every 8 hours PRN Nausea and/or Vomiting      Vital Signs Last 24 Hrs  T(C): 36.7 (07 Aug 2023 05:11), Max: 37.2 (06 Aug 2023 15:39)  T(F): 98.1 (07 Aug 2023 05:11), Max: 99 (06 Aug 2023 15:39)  HR: 68 (07 Aug 2023 05:11) (58 - 68)  BP: 113/76 (07 Aug 2023 05:11) (113/76 - 133/89)  BP(mean): 88 (07 Aug 2023 05:11) (88 - 96)  RR: 17 (07 Aug 2023 05:11) (17 - 18)  SpO2: 97% (07 Aug 2023 05:11) (97% - 99%)    Parameters below as of 07 Aug 2023 05:11  Patient On (Oxygen Delivery Method): room air        Physical Exam:  General: NAD, resting comfortably in bed  Pulmonary: Nonlabored breathing, no respiratory distress  Cardiovascular: NSR  Abdominal: soft, NT/ND  Extremities: WWP, normal strength  Neuro: A/O x 3, CNs II-XII grossly intact, no focal deficits    I&O's Summary    06 Aug 2023 07:01  -  07 Aug 2023 07:00  --------------------------------------------------------  IN: 1060 mL / OUT: 1350 mL / NET: -290 mL        LABS:                        12.0   7.40  )-----------( 236      ( 07 Aug 2023 05:30 )             36.6     08-07    142  |  107  |  3<L>  ----------------------------<  87  4.0   |  26  |  0.53    Ca    8.6      07 Aug 2023 05:30  Phos  3.9     08-07  Mg     1.7     08-07    TPro  6.0  /  Alb  3.6  /  TBili  0.4  /  DBili  0.2  /  AST  70<H>  /  ALT  165<H>  /  AlkPhos  120  08-07    PT/INR - ( 07 Aug 2023 05:30 )   PT: 10.3 sec;   INR: 0.90          PTT - ( 07 Aug 2023 05:30 )  PTT:35.8 sec  Urinalysis Basic - ( 07 Aug 2023 05:30 )    Color: x / Appearance: x / SG: x / pH: x  Gluc: 87 mg/dL / Ketone: x  / Bili: x / Urobili: x   Blood: x / Protein: x / Nitrite: x   Leuk Esterase: x / RBC: x / WBC x   Sq Epi: x / Non Sq Epi: x / Bacteria: x      CAPILLARY BLOOD GLUCOSE        LIVER FUNCTIONS - ( 07 Aug 2023 05:30 )  Alb: 3.6 g/dL / Pro: 6.0 g/dL / ALK PHOS: 120 U/L / ALT: 165 U/L / AST: 70 U/L / GGT: x             RADIOLOGY & ADDITIONAL STUDIES:   SUBJECTIVE:  Pt seen this AM on rounds. Pt endorses some pain in the RUQ. Pt denies nausea/vomiting, CP, SOB.     MEDICATIONS  (STANDING):  cefTRIAXone   IVPB 1000 milliGRAM(s) IV Intermittent every 24 hours  lactated ringers. 1000 milliLiter(s) (120 mL/Hr) IV Continuous <Continuous>  metroNIDAZOLE  IVPB 500 milliGRAM(s) IV Intermittent every 8 hours  metroNIDAZOLE  IVPB      prenatal multivitamin 1 Tablet(s) Oral daily    MEDICATIONS  (PRN):  acetaminophen     Tablet .. 650 milliGRAM(s) Oral every 6 hours PRN Temp greater or equal to 38C (100.4F), Mild Pain (1 - 3)  melatonin 3 milliGRAM(s) Oral at bedtime PRN Insomnia  ondansetron Injectable 4 milliGRAM(s) IV Push every 8 hours PRN Nausea and/or Vomiting      Vital Signs Last 24 Hrs  T(C): 36.7 (07 Aug 2023 05:11), Max: 37.2 (06 Aug 2023 15:39)  T(F): 98.1 (07 Aug 2023 05:11), Max: 99 (06 Aug 2023 15:39)  HR: 68 (07 Aug 2023 05:11) (58 - 68)  BP: 113/76 (07 Aug 2023 05:11) (113/76 - 133/89)  BP(mean): 88 (07 Aug 2023 05:11) (88 - 96)  RR: 17 (07 Aug 2023 05:11) (17 - 18)  SpO2: 97% (07 Aug 2023 05:11) (97% - 99%)    Parameters below as of 07 Aug 2023 05:11  Patient On (Oxygen Delivery Method): room air        Physical Exam:  General: NAD, resting comfortably in bed  Pulmonary: Nonlabored breathing, no respiratory distress  Cardiovascular: NSR  Abdominal: soft, mildly ttp in RUQ, ND  Extremities: WWP, normal strength  Neuro: A/O x 3, CNs II-XII grossly intact, no focal deficits    I&O's Summary    06 Aug 2023 07:01  -  07 Aug 2023 07:00  --------------------------------------------------------  IN: 1060 mL / OUT: 1350 mL / NET: -290 mL        LABS:                        12.0   7.40  )-----------( 236      ( 07 Aug 2023 05:30 )             36.6     08-07    142  |  107  |  3<L>  ----------------------------<  87  4.0   |  26  |  0.53    Ca    8.6      07 Aug 2023 05:30  Phos  3.9     08-07  Mg     1.7     08-07    TPro  6.0  /  Alb  3.6  /  TBili  0.4  /  DBili  0.2  /  AST  70<H>  /  ALT  165<H>  /  AlkPhos  120  08-07    PT/INR - ( 07 Aug 2023 05:30 )   PT: 10.3 sec;   INR: 0.90          PTT - ( 07 Aug 2023 05:30 )  PTT:35.8 sec  Urinalysis Basic - ( 07 Aug 2023 05:30 )    Color: x / Appearance: x / SG: x / pH: x  Gluc: 87 mg/dL / Ketone: x  / Bili: x / Urobili: x   Blood: x / Protein: x / Nitrite: x   Leuk Esterase: x / RBC: x / WBC x   Sq Epi: x / Non Sq Epi: x / Bacteria: x      CAPILLARY BLOOD GLUCOSE        LIVER FUNCTIONS - ( 07 Aug 2023 05:30 )  Alb: 3.6 g/dL / Pro: 6.0 g/dL / ALK PHOS: 120 U/L / ALT: 165 U/L / AST: 70 U/L / GGT: x             RADIOLOGY & ADDITIONAL STUDIES:

## 2023-08-07 NOTE — PROGRESS NOTE ADULT - SUBJECTIVE AND OBJECTIVE BOX
Patient is a 27y old  Female who presents with a chief complaint of Cholelithiasis and Gallbladder distention. (07 Aug 2023 07:59)    INTERVAL EVENTS:    SUBJECTIVE:  Patient was seen and examined at bedside. Reports feeling at baseline, denies SOB, no chest pain, no dizziness, no LE edema. Awaiting OR. No other complaints or events reported.    Review of systems: No fever, chills, dizziness, HA, Changes in vision, CP, dyspnea, nausea or vomiting, dysuria, changes in bowel movements, LE edema. Rest of 12 point Review of systems negative unless otherwise documented elsewhere in note.     Diet, Clear Liquid (08-06-23 @ 12:25) [Active]  Diet, NPO after Midnight:      NPO Start Date: 06-Aug-2023,   NPO Start Time: 23:59 (08-06-23 @ 11:47) [Active]      MEDICATIONS:  MEDICATIONS  (STANDING):  cefTRIAXone   IVPB 1000 milliGRAM(s) IV Intermittent every 24 hours  lactated ringers. 1000 milliLiter(s) (120 mL/Hr) IV Continuous <Continuous>  metroNIDAZOLE  IVPB 500 milliGRAM(s) IV Intermittent every 8 hours  metroNIDAZOLE  IVPB      prenatal multivitamin 1 Tablet(s) Oral daily    MEDICATIONS  (PRN):  acetaminophen     Tablet .. 650 milliGRAM(s) Oral every 6 hours PRN Temp greater or equal to 38C (100.4F), Mild Pain (1 - 3)  melatonin 3 milliGRAM(s) Oral at bedtime PRN Insomnia  ondansetron Injectable 4 milliGRAM(s) IV Push every 8 hours PRN Nausea and/or Vomiting      Allergies    No Known Allergies    Intolerances        OBJECTIVE:  Vital Signs Last 24 Hrs  T(C): 37 (07 Aug 2023 08:21), Max: 37.2 (06 Aug 2023 15:39)  T(F): 98.6 (07 Aug 2023 08:21), Max: 99 (06 Aug 2023 15:39)  HR: 66 (07 Aug 2023 08:21) (58 - 68)  BP: 128/85 (07 Aug 2023 08:21) (113/76 - 133/89)  BP(mean): 88 (07 Aug 2023 05:11) (88 - 96)  RR: 17 (07 Aug 2023 08:21) (17 - 18)  SpO2: 97% (07 Aug 2023 08:21) (97% - 99%)    Parameters below as of 07 Aug 2023 08:21  Patient On (Oxygen Delivery Method): room air      I&O's Summary    06 Aug 2023 07:01  -  07 Aug 2023 07:00  --------------------------------------------------------  IN: 1060 mL / OUT: 1350 mL / NET: -290 mL    07 Aug 2023 07:01  -  07 Aug 2023 11:05  --------------------------------------------------------  IN: 360 mL / OUT: 900 mL / NET: -540 mL        PHYSICAL EXAM:  General: AOX3, NAD, sitting in bed, speaking in full sentences, no labored breathing on RA  HEENT: AT/NC, no facial asymmetry  Lungs: no crackles, no wheezes  Heart: RRR  Abdomen: soft, mild RUQ tenderness, no guarding, + BS  Extremities: warm, no edema, no tenderness, no focal deficit     LABS:                        12.0   7.40  )-----------( 236      ( 07 Aug 2023 05:30 )             36.6     08-07    142  |  107  |  3<L>  ----------------------------<  87  4.0   |  26  |  0.53    Ca    8.6      07 Aug 2023 05:30  Phos  3.9     08-07  Mg     1.7     08-07    TPro  6.0  /  Alb  3.6  /  TBili  0.4  /  DBili  0.2  /  AST  70<H>  /  ALT  165<H>  /  AlkPhos  120  08-07    LIVER FUNCTIONS - ( 07 Aug 2023 05:30 )  Alb: 3.6 g/dL / Pro: 6.0 g/dL / ALK PHOS: 120 U/L / ALT: 165 U/L / AST: 70 U/L / GGT: x           PT/INR - ( 07 Aug 2023 05:30 )   PT: 10.3 sec;   INR: 0.90          PTT - ( 07 Aug 2023 05:30 )  PTT:35.8 sec  CAPILLARY BLOOD GLUCOSE        Urinalysis Basic - ( 07 Aug 2023 05:30 )    Color: x / Appearance: x / SG: x / pH: x  Gluc: 87 mg/dL / Ketone: x  / Bili: x / Urobili: x   Blood: x / Protein: x / Nitrite: x   Leuk Esterase: x / RBC: x / WBC x   Sq Epi: x / Non Sq Epi: x / Bacteria: x        MICRODATA:    Urinalysis with Rflx Culture (collected 04 Aug 2023 22:05)        RADIOLOGY/OTHER STUDIES:

## 2023-08-07 NOTE — PROGRESS NOTE ADULT - TIME BILLING
evaluation and management of acute cholecystitis vs. choledocholithiasis, review of labs and imaging, discussion with consultants, discussion with patient, documentation.
evaluation and management of acute cholecystitis vs. choledocholithiasis, review of labs and imaging, discussion with consultants, discussion with patient, documentation.

## 2023-08-07 NOTE — BRIEF OPERATIVE NOTE - OPERATION/FINDINGS
With patient supine on table and under general anesthesia w/ ETT intubation, steep reverse Trendelenburg was done and Veress needle entry was achieved at the subxiphoid region. Abdomen was insufflated and LLQ port was placed. Camera was inserted and 2 RLQ ports were placed under direct visualization. Robot was docked and instruments were inserted into abdominal cavity also under direct visualization. Careful dissection was performed at infundibulum of gallbladder and around cystic structure until the critical view of safety was in clear view and no stones were appreciated in cystic duct. Clips were applied on the cystic duct and duct was dissected carefully; no bile spillage noted from stump. The cystic artery was cauterized and divided; no bleeding noted. The gallbladder was then dissected off the liver bed; no bleeding noted in gallbladder fossa. Instruments were taken out and robot was undocked. Camera was put back in through RLQ port, and under direct visualization, endocatch bag was inserted and used to retrieve the gallbladder. Careful inspection of the abdomen was done with the camera and no bleeding noted. Fascia of LLQ port site was spread with hemostat for easy retrieval of gallbladder. Fascia was then closed with endoclose x 2, ports were removed, and skin was closed with 4-0 Monocryl. Dermabond was used to reinforce closure in all 4 port sites and the subxiphoid Veress needle entry site. No intra-op complications, minimal EBL. 1L of crystalloid given. Patient was subsequently extubated and taken to PACU in stable condition.

## 2023-08-08 ENCOUNTER — TRANSCRIPTION ENCOUNTER (OUTPATIENT)
Age: 28
End: 2023-08-08

## 2023-08-08 VITALS
TEMPERATURE: 98 F | HEART RATE: 67 BPM | RESPIRATION RATE: 16 BRPM | SYSTOLIC BLOOD PRESSURE: 114 MMHG | DIASTOLIC BLOOD PRESSURE: 80 MMHG | OXYGEN SATURATION: 99 %

## 2023-08-08 LAB
ALBUMIN SERPL ELPH-MCNC: 3.9 G/DL — SIGNIFICANT CHANGE UP (ref 3.3–5)
ALP SERPL-CCNC: 156 U/L — HIGH (ref 40–120)
ALT FLD-CCNC: 169 U/L — HIGH (ref 10–45)
ANION GAP SERPL CALC-SCNC: 11 MMOL/L — SIGNIFICANT CHANGE UP (ref 5–17)
AST SERPL-CCNC: 145 U/L — HIGH (ref 10–40)
BILIRUB SERPL-MCNC: 0.6 MG/DL — SIGNIFICANT CHANGE UP (ref 0.2–1.2)
BUN SERPL-MCNC: 4 MG/DL — LOW (ref 7–23)
CALCIUM SERPL-MCNC: 8.9 MG/DL — SIGNIFICANT CHANGE UP (ref 8.4–10.5)
CHLORIDE SERPL-SCNC: 101 MMOL/L — SIGNIFICANT CHANGE UP (ref 96–108)
CO2 SERPL-SCNC: 25 MMOL/L — SIGNIFICANT CHANGE UP (ref 22–31)
CREAT SERPL-MCNC: 0.47 MG/DL — LOW (ref 0.5–1.3)
EGFR: 132 ML/MIN/1.73M2 — SIGNIFICANT CHANGE UP
GLUCOSE SERPL-MCNC: 101 MG/DL — HIGH (ref 70–99)
HCT VFR BLD CALC: 37 % — SIGNIFICANT CHANGE UP (ref 34.5–45)
HGB BLD-MCNC: 12.4 G/DL — SIGNIFICANT CHANGE UP (ref 11.5–15.5)
MAGNESIUM SERPL-MCNC: 1.9 MG/DL — SIGNIFICANT CHANGE UP (ref 1.6–2.6)
MCHC RBC-ENTMCNC: 28.8 PG — SIGNIFICANT CHANGE UP (ref 27–34)
MCHC RBC-ENTMCNC: 33.5 GM/DL — SIGNIFICANT CHANGE UP (ref 32–36)
MCV RBC AUTO: 85.8 FL — SIGNIFICANT CHANGE UP (ref 80–100)
NRBC # BLD: 0 /100 WBCS — SIGNIFICANT CHANGE UP (ref 0–0)
PHOSPHATE SERPL-MCNC: 4.6 MG/DL — HIGH (ref 2.5–4.5)
PLATELET # BLD AUTO: 272 K/UL — SIGNIFICANT CHANGE UP (ref 150–400)
POTASSIUM SERPL-MCNC: 3.9 MMOL/L — SIGNIFICANT CHANGE UP (ref 3.5–5.3)
POTASSIUM SERPL-SCNC: 3.9 MMOL/L — SIGNIFICANT CHANGE UP (ref 3.5–5.3)
PROT SERPL-MCNC: 6.6 G/DL — SIGNIFICANT CHANGE UP (ref 6–8.3)
RBC # BLD: 4.31 M/UL — SIGNIFICANT CHANGE UP (ref 3.8–5.2)
RBC # FLD: 12.3 % — SIGNIFICANT CHANGE UP (ref 10.3–14.5)
SODIUM SERPL-SCNC: 137 MMOL/L — SIGNIFICANT CHANGE UP (ref 135–145)
WBC # BLD: 9.15 K/UL — SIGNIFICANT CHANGE UP (ref 3.8–10.5)
WBC # FLD AUTO: 9.15 K/UL — SIGNIFICANT CHANGE UP (ref 3.8–10.5)

## 2023-08-08 PROCEDURE — 36415 COLL VENOUS BLD VENIPUNCTURE: CPT

## 2023-08-08 PROCEDURE — 86900 BLOOD TYPING SEROLOGIC ABO: CPT

## 2023-08-08 PROCEDURE — 81001 URINALYSIS AUTO W/SCOPE: CPT

## 2023-08-08 PROCEDURE — 80048 BASIC METABOLIC PNL TOTAL CA: CPT

## 2023-08-08 PROCEDURE — 86901 BLOOD TYPING SEROLOGIC RH(D): CPT

## 2023-08-08 PROCEDURE — 86705 HEP B CORE ANTIBODY IGM: CPT

## 2023-08-08 PROCEDURE — 85730 THROMBOPLASTIN TIME PARTIAL: CPT

## 2023-08-08 PROCEDURE — 87340 HEPATITIS B SURFACE AG IA: CPT

## 2023-08-08 PROCEDURE — 86850 RBC ANTIBODY SCREEN: CPT

## 2023-08-08 PROCEDURE — 74183 MRI ABD W/O CNTR FLWD CNTR: CPT | Mod: MA

## 2023-08-08 PROCEDURE — 86803 HEPATITIS C AB TEST: CPT

## 2023-08-08 PROCEDURE — 82247 BILIRUBIN TOTAL: CPT

## 2023-08-08 PROCEDURE — 88304 TISSUE EXAM BY PATHOLOGIST: CPT

## 2023-08-08 PROCEDURE — 96374 THER/PROPH/DIAG INJ IV PUSH: CPT

## 2023-08-08 PROCEDURE — 83735 ASSAY OF MAGNESIUM: CPT

## 2023-08-08 PROCEDURE — 86704 HEP B CORE ANTIBODY TOTAL: CPT

## 2023-08-08 PROCEDURE — 93005 ELECTROCARDIOGRAM TRACING: CPT

## 2023-08-08 PROCEDURE — 86706 HEP B SURFACE ANTIBODY: CPT

## 2023-08-08 PROCEDURE — 85610 PROTHROMBIN TIME: CPT

## 2023-08-08 PROCEDURE — 99285 EMERGENCY DEPT VISIT HI MDM: CPT

## 2023-08-08 PROCEDURE — 84100 ASSAY OF PHOSPHORUS: CPT

## 2023-08-08 PROCEDURE — 85025 COMPLETE CBC W/AUTO DIFF WBC: CPT

## 2023-08-08 PROCEDURE — 80076 HEPATIC FUNCTION PANEL: CPT

## 2023-08-08 PROCEDURE — 86709 HEPATITIS A IGM ANTIBODY: CPT

## 2023-08-08 PROCEDURE — 82248 BILIRUBIN DIRECT: CPT

## 2023-08-08 PROCEDURE — A9585: CPT

## 2023-08-08 PROCEDURE — C1889: CPT

## 2023-08-08 PROCEDURE — 83690 ASSAY OF LIPASE: CPT

## 2023-08-08 PROCEDURE — S2900: CPT

## 2023-08-08 PROCEDURE — 84702 CHORIONIC GONADOTROPIN TEST: CPT

## 2023-08-08 PROCEDURE — 96375 TX/PRO/DX INJ NEW DRUG ADDON: CPT

## 2023-08-08 PROCEDURE — 76705 ECHO EXAM OF ABDOMEN: CPT

## 2023-08-08 PROCEDURE — 85027 COMPLETE CBC AUTOMATED: CPT

## 2023-08-08 PROCEDURE — 80053 COMPREHEN METABOLIC PANEL: CPT

## 2023-08-08 RX ORDER — SIMETHICONE 80 MG/1
80 TABLET, CHEWABLE ORAL ONCE
Refills: 0 | Status: COMPLETED | OUTPATIENT
Start: 2023-08-08 | End: 2023-08-08

## 2023-08-08 RX ORDER — OXYCODONE HYDROCHLORIDE 5 MG/1
1 TABLET ORAL
Qty: 12 | Refills: 0
Start: 2023-08-08 | End: 2023-08-10

## 2023-08-08 RX ADMIN — SODIUM CHLORIDE 80 MILLILITER(S): 9 INJECTION, SOLUTION INTRAVENOUS at 00:09

## 2023-08-08 RX ADMIN — Medication 650 MILLIGRAM(S): at 05:09

## 2023-08-08 RX ADMIN — SIMETHICONE 80 MILLIGRAM(S): 80 TABLET, CHEWABLE ORAL at 01:44

## 2023-08-08 RX ADMIN — ENOXAPARIN SODIUM 40 MILLIGRAM(S): 100 INJECTION SUBCUTANEOUS at 00:08

## 2023-08-08 NOTE — DISCHARGE NOTE PROVIDER - NSDCMRMEDTOKEN_GEN_ALL_CORE_FT
ferrous sulfate 325 mg (65 mg elemental iron) oral tablet: 1 tab(s) orally every other day  oxyCODONE 5 mg oral tablet: 1 tab(s) orally every 6 hours as needed for  severe pain MDD: 4  PNV Select oral tablet: 1 tab(s) orally once a day

## 2023-08-08 NOTE — DISCHARGE NOTE NURSING/CASE MANAGEMENT/SOCIAL WORK - PATIENT PORTAL LINK FT
You can access the FollowMyHealth Patient Portal offered by [facility] by registering at the following website: [email or web address]. By joining [facility]’s FollowMyHealth portal, you will also be able to view your health information using other applications (apps) compatible with our system.

## 2023-08-08 NOTE — DISCHARGE NOTE PROVIDER - NSDCCONDITION_GEN_ALL_CORE
Patient care assumed, bedside report received, POC discussed, verbalizes understanding. Safety measures in place, call light in place.        Stable

## 2023-08-08 NOTE — DISCHARGE NOTE PROVIDER - NSDCFUADDINST_GEN_ALL_CORE_FT
Take 2 tabs tylenol every 6 hours as needed for pain management. You have also been prescribed oxycodone for pain not controlled by tylenol. Take 1 tabs as prescribed for severe pain. Take stool softener (colace) to prevent constipation caused by oxycodone.     General Discharge Instructions:  Please resume all regular home medications unless specifically advised not to take a particular medication. Also, please take any new medications as prescribed.  Please get plenty of rest, continue to ambulate several times per day, and drink adequate amounts of fluids. Avoid lifting weights greater than 5-10 lbs until you follow-up with your surgeon, who will instruct you further regarding activity restrictions.  Avoid driving or operating heavy machinery while taking pain medications.  Please follow-up with your surgeon and Primary Care Provider (PCP) as advised.  Incision Care:  *Please call your doctor or nurse practitioner if you have increased pain, swelling, redness, or drainage from the incision site.  *Avoid swimming and baths until your follow-up appointment.  *You may shower, and wash surgical incisions with a mild soap and warm water. Gently pat the area dry.  *If you have staples, they will be removed at your follow-up appointment.  *If you have steri-strips, they will fall off on their own. Please remove any remaining strips 7-10 days after surgery.    Warning Signs:  Please call your doctor or nurse practitioner if you experience the following:  *You experience new chest pain, pressure, squeezing or tightness.  *New or worsening cough, shortness of breath, or wheeze.  *If you are vomiting and cannot keep down fluids or your medications.  *You are getting dehydrated due to continued vomiting, diarrhea, or other reasons. Signs of dehydration include dry mouth, rapid heartbeat, or feeling dizzy or faint when standing.  *You see blood or dark/black material when you vomit or have a bowel movement.  *You experience burning when you urinate, have blood in your urine, or experience a discharge.  *Your pain is not improving within 8-12 hours or is not gone within 24 hours. Call or return immediately if your pain is getting worse, changes location, or moves to your chest or back.  *You have shaking chills, or fever greater than 101.5 degrees Fahrenheit or 38 degrees Celsius.  *Any change in your symptoms, or any new symptoms that concern you.

## 2023-08-08 NOTE — PROGRESS NOTE ADULT - ASSESSMENT
27 year old female  with spontaneous vaginal delievery c/b gestational hypertension one month prior who presents to the ED with RUQ abdominal pain for the past 5 hours associated with nausea, admitted for cholelithiasis and biliary distention, rule out choledocholithiasis       Problem/Plan - 1:  ·  Problem: Symptomatic cholelithiasis.     - MRCP - mild fluid in gallbladder, without wall thickening, no bilary ductal dilatation or choledocholithiasis  - f/u Surgery recs - plan for OR cholecystectomy  -ATB per surgery, patient breastfeeding, team and anesthesia aware         Problem/Plan - 2:  ·  Problem: Transaminitis.   ·  Plan: elevated AST/ALT   suspected 2.2 GB stone   Bilirubin trended down to 0.5 from 1.3.     Problem/Plan - 3:  ·  Problem: Postpartum state.   ·  Plan: No postpartum complications, patient continues to breastfeed.     Problem/Plan - 4:  ·  Problem: Prolonged QT interval.   ·  Plan: QTC noted to be mildly prolonged at 486, noted to have mild bradycardia.     - Care with QT prolonging agents.   - Continue to monitor.  - follow up EKG.    DVT ppx: per primary team  Discussed with primary team 
27F PMHx YAIR, known hepatomegaly  induced 1 month ago for gestational HTN, admitted from ED for acute cholecystitis. Plans for robotic tawanda     OR today for robotic cholecystectomy   NPO w/ IVF  Pain and nausea prn  Avoid QT prolonging meds  Trend LFTs w/ AM labs
27F PMHx YAIR, known hepatomegaly  induced 1 month ago for gestational HTN, admitted from ED for acute cholecystitis. S/p robotic tawanda     LFD  Pain and nausea prn  Avoid QT prolonging meds  SCDs/OOBA  Trend LFTs w/ AM labs
27F PMHx YAIR, known hepatomegaly  induced 1 month ago for gestational hypertension presents w/ 1-day history of abdominal pain associated w/ nausea and 1 episode of NBNB, admitted to medicine for US findings equivocal for acute cholecystitis. General surgery consulted for r/o acute cholecystitis w/ choledocholithiasis. Patient is afebrile, nontachycardic, normotensive. Abdomen, nondistended nontender. Labs show 13.79, H/H 13/39, chemistry unremarkable, T bili 0.8, AST/ALT 68/36, Alk Phos 123. RUQ demonstrates distended gallbladder with multiple mobile stones, no pericholecystic fluid, wall thickening, or Marte's sign; findings are equivocal foracute cholecystitis; dilated common bile duct, 7mm. Recommend further evaluation with MRCP to evaluate for choledocholithiasis; hepatomegaly.
Ms. Matt is a 27 year old female  with spontaneous vaginal delievery c/b gestational hypertension one month prior who presents to the ED with RUQ abdominal pain for the past 5 hours associated with nausea, admitted for cholelithiasis and biliary distention, rule out choledocholithiasis
Patient is a 27 year old woman with history of anemia, hepatomegaly, gestational hypertension, now 1 month post partum, now with symptomatic cholelithiasis vs. acute cholecystitis.     MRCP negative  OK for CLD  Plan for OR tomorrow for robotic assisted cholecystectomy  Transfer to surgery team    Late entry, date of service 8/6/23
Ms. Matt is a 27 year old female  with spontaneous vaginal delievery c/b gestational hypertension one month prior who presents to the ED with RUQ abdominal pain for the past 5 hours associated with nausea, admitted for cholelithiasis and biliary distention, rule out choledocholithiasis

## 2023-08-08 NOTE — PROGRESS NOTE ADULT - SUBJECTIVE AND OBJECTIVE BOX
STATUS POST:  robotic cholecystectomy      SUBJECTIVE: Patient seen and examined bedside by chief resident. complains of abdominal soreness and some shoulder pain. tolerating low fat diet without nausea or vomiting     enoxaparin Injectable 40 milliGRAM(s) SubCutaneous every 24 hours      Vital Signs Last 24 Hrs  T(C): 37 (08 Aug 2023 05:05), Max: 37.1 (07 Aug 2023 20:48)  T(F): 98.6 (08 Aug 2023 05:05), Max: 98.8 (07 Aug 2023 20:48)  HR: 69 (08 Aug 2023 05:05) (58 - 99)  BP: 108/69 (08 Aug 2023 05:05) (108/69 - 140/78)  BP(mean): 99 (07 Aug 2023 20:15) (88 - 103)  RR: 17 (08 Aug 2023 05:05) (13 - 20)  SpO2: 98% (08 Aug 2023 05:05) (96% - 100%)    Parameters below as of 08 Aug 2023 05:05  Patient On (Oxygen Delivery Method): room air      I&O's Detail    07 Aug 2023 07:01  -  08 Aug 2023 07:00  --------------------------------------------------------  IN:    Lactated Ringers: 720 mL    Lactated Ringers: 1040 mL  Total IN: 1760 mL    OUT:    Oral Fluid: 0 mL    Voided (mL): 2900 mL  Total OUT: 2900 mL    Total NET: -1140 mL          General: NAD, resting comfortably in bed  C/V: NSR  Pulm: Nonlabored breathing, no respiratory distress  Abd: soft, NT/ND. incisions are clean, dry and intact   Extrem: WWP, no edema, SCDs in place        LABS:                        12.0   7.40  )-----------( 236      ( 07 Aug 2023 05:30 )             36.6     08-07    142  |  107  |  3<L>  ----------------------------<  87  4.0   |  26  |  0.53    Ca    8.6      07 Aug 2023 05:30  Phos  3.9     08-07  Mg     1.7     08-07    TPro  6.0  /  Alb  3.6  /  TBili  0.4  /  DBili  0.2  /  AST  70<H>  /  ALT  165<H>  /  AlkPhos  120  08-07    PT/INR - ( 07 Aug 2023 05:30 )   PT: 10.3 sec;   INR: 0.90          PTT - ( 07 Aug 2023 05:30 )  PTT:35.8 sec  Urinalysis Basic - ( 07 Aug 2023 05:30 )    Color: x / Appearance: x / SG: x / pH: x  Gluc: 87 mg/dL / Ketone: x  / Bili: x / Urobili: x   Blood: x / Protein: x / Nitrite: x   Leuk Esterase: x / RBC: x / WBC x   Sq Epi: x / Non Sq Epi: x / Bacteria: x        RADIOLOGY & ADDITIONAL STUDIES:

## 2023-08-08 NOTE — PROGRESS NOTE ADULT - REASON FOR ADMISSION
Cholelithiasis and Gallbladder distention.

## 2023-08-08 NOTE — DISCHARGE NOTE PROVIDER - HOSPITAL COURSE
27F PMHx YAIR, known hepatomegaly  induced 1 month ago for gestational hypertension presents w/ 1-day history of abdominal pain. RUQ demonstrates distended gallbladder with multiple mobile stones, no pericholecystic fluid, wall thickening, or Marte's sign; findings are equivocal foracute cholecystitis; dilated common bile duct, 7mm. MRCP reviewed: CBD 0.5 without Choledocho, Likely transient elevation of LT's in s/o passed stone.   She underwent a robotic cholecystectomy on . Patient's post-operative course was uncomplicated. Diet was advanced as tolerated and pain was well controlled on medication. On day of discharge, pt deemed stable and ready to return home with plan to follow up as an outpatient.

## 2023-08-08 NOTE — DISCHARGE NOTE PROVIDER - CARE PROVIDER_API CALL
Lachelle Fraga  Surgery  [address], [address]  [place] [address]  Phone: [phone]  Fax: [phone]  Follow Up Time:

## 2023-08-09 PROBLEM — O13.9 GESTATIONAL [PREGNANCY-INDUCED] HYPERTENSION WITHOUT SIGNIFICANT PROTEINURIA, UNSPECIFIED TRIMESTER: Chronic | Status: ACTIVE | Noted: 2023-08-04

## 2023-08-11 DIAGNOSIS — K80.00 CALCULUS OF GALLBLADDER WITH ACUTE CHOLECYSTITIS WITHOUT OBSTRUCTION: ICD-10-CM

## 2023-08-11 DIAGNOSIS — R10.9 UNSPECIFIED ABDOMINAL PAIN: ICD-10-CM

## 2023-08-11 DIAGNOSIS — R94.31 ABNORMAL ELECTROCARDIOGRAM [ECG] [EKG]: ICD-10-CM

## 2023-08-21 ENCOUNTER — APPOINTMENT (OUTPATIENT)
Dept: BARIATRICS | Facility: CLINIC | Age: 28
End: 2023-08-21
Payer: COMMERCIAL

## 2023-08-21 VITALS
BODY MASS INDEX: 29.53 KG/M2 | HEIGHT: 64 IN | DIASTOLIC BLOOD PRESSURE: 82 MMHG | WEIGHT: 173 LBS | TEMPERATURE: 97.3 F | OXYGEN SATURATION: 98 % | HEART RATE: 62 BPM | SYSTOLIC BLOOD PRESSURE: 121 MMHG

## 2023-08-21 DIAGNOSIS — K80.20 CALCULUS OF GALLBLADDER W/OUT CHOLECYSTITIS W/OUT OBSTRUCTION: ICD-10-CM

## 2023-08-21 DIAGNOSIS — Z98.84 BARIATRIC SURGERY STATUS: ICD-10-CM

## 2023-08-21 PROCEDURE — 99024 POSTOP FOLLOW-UP VISIT: CPT

## 2023-08-21 NOTE — ASSESSMENT
[FreeTextEntry1] : Pt is a 26 y/o F 2 weeks s/p laparoscopic cholecystectomy who presents today feeling well here for post op care. Pt denies any fevers, chest pn, sob, calf pain, n,v,c,d since sx. Pt notes some abdominal soreness periumbilically but notes this discomfort has been improving. Pt states she no longer is taking any pain medication and has been resuming her usual home activities and day to day walking. Pt understands she should avoid heavy lifting x 6 weeks after sx and discussed the importance of avoiding fried or fatty foods. No other concerns at this time, f/u prn.

## 2023-08-21 NOTE — HISTORY OF PRESENT ILLNESS
[de-identified] : Pt is a 28 y/o F 2 weeks s/p laparoscopic cholecystectomy who presents today feeling well here for post op care. Pt denies any fevers, chest pn, sob, calf pain, n,v,c,d since sx. Pt notes some abdominal soreness periumbilically but notes this discomfort has been improving. Pt states she no longer is taking any pain medication and has been resuming her usual home activities and day to day walking. Pt understands she should avoid heavy lifting x 6 weeks after sx and discussed the importance of avoiding fried or fatty foods. No other concerns at this time, f/u prn.

## 2023-08-25 LAB — SURGICAL PATHOLOGY STUDY: SIGNIFICANT CHANGE UP

## 2023-09-13 ENCOUNTER — TRANSCRIPTION ENCOUNTER (OUTPATIENT)
Age: 28
End: 2023-09-13

## 2023-09-24 NOTE — PATIENT PROFILE ADULT - PATIENT'S SEXUAL ORIENTATION
DVT ppx heparin subQ  dispo: pending otho post op recs, will discuss transfer to ortho service
Unknown
DVT ppx heparin subQ  dispo: [facility]    Medicine will continue to follow for comanagement
DVT ppx heparin subQ  dispo: pending spine surgery.

## 2023-10-11 ENCOUNTER — TRANSCRIPTION ENCOUNTER (OUTPATIENT)
Age: 28
End: 2023-10-11

## 2023-10-31 ENCOUNTER — TRANSCRIPTION ENCOUNTER (OUTPATIENT)
Age: 28
End: 2023-10-31

## 2023-12-14 ENCOUNTER — TRANSCRIPTION ENCOUNTER (OUTPATIENT)
Age: 28
End: 2023-12-14

## 2024-02-12 ENCOUNTER — APPOINTMENT (OUTPATIENT)
Dept: OBGYN | Facility: CLINIC | Age: 29
End: 2024-02-12
Payer: COMMERCIAL

## 2024-02-12 VITALS
HEIGHT: 64 IN | BODY MASS INDEX: 28.68 KG/M2 | HEART RATE: 66 BPM | WEIGHT: 168 LBS | SYSTOLIC BLOOD PRESSURE: 131 MMHG | DIASTOLIC BLOOD PRESSURE: 84 MMHG | OXYGEN SATURATION: 98 %

## 2024-02-12 DIAGNOSIS — Z01.419 ENCOUNTER FOR GYNECOLOGICAL EXAMINATION (GENERAL) (ROUTINE) W/OUT ABNORMAL FINDINGS: ICD-10-CM

## 2024-02-12 PROCEDURE — 99395 PREV VISIT EST AGE 18-39: CPT

## 2024-02-12 RX ORDER — NORETHINDRONE ACETATE AND ETHINYL ESTRADIOL AND FERROUS FUMARATE 1MG-20(21)
1-20 KIT ORAL
Qty: 1 | Refills: 12 | Status: ACTIVE | COMMUNITY
Start: 2024-02-12 | End: 1900-01-01

## 2024-02-12 RX ORDER — NORETHINDRONE 0.35 MG/1
0.35 TABLET ORAL DAILY
Qty: 1 | Refills: 10 | Status: DISCONTINUED | COMMUNITY
Start: 2023-08-04 | End: 2024-02-12

## 2024-02-12 NOTE — HISTORY OF PRESENT ILLNESS
[LMP unknown] : LMP unknown [Y] : Positive pregnancy history [Patient reported PAP Smear was normal] : Patient reported PAP Smear was normal [N] : Patient is not sexually active [Menarche Age: ____] : age at menarche was [unfilled] [Currently Active] : currently active [No] : No [Patient refuses STI testing] : Patient refuses STI testing [FreeTextEntry1] : 29 yo patient presents for well women visit no complaints or concerns. Gave birth 7 months ago, breast feeding, no menses. still taking prenatals; baby taking solids feeling more dryness in vaginal area-reassurance [PapSmeardate] : 2022 [PGxTotal] : 1 [[facility] : 1

## 2024-02-19 LAB
CYTOLOGY CVX/VAG DOC THIN PREP: NORMAL
HPV HIGH+LOW RISK DNA PNL CVX: NOT DETECTED

## 2024-07-22 ENCOUNTER — TRANSCRIPTION ENCOUNTER (OUTPATIENT)
Age: 29
End: 2024-07-22

## 2024-07-24 ENCOUNTER — TRANSCRIPTION ENCOUNTER (OUTPATIENT)
Age: 29
End: 2024-07-24

## 2024-08-06 ENCOUNTER — APPOINTMENT (OUTPATIENT)
Dept: OBGYN | Facility: CLINIC | Age: 29
End: 2024-08-06

## 2024-08-06 PROBLEM — N92.6 IRREGULAR MENSTRUAL CYCLE: Status: ACTIVE | Noted: 2024-08-06

## 2024-08-06 PROCEDURE — 99212 OFFICE O/P EST SF 10 MIN: CPT

## 2024-08-06 NOTE — HISTORY OF PRESENT ILLNESS
[FreeTextEntry1] : 29 y/o F presents here for irregular menstrual since giving birth. pumping occ; had menses 7/1 lasted 1 week and then stopped and then spotted again 4 days; light started pills 8 months ago baby is doing well; 13 months

## 2024-09-06 ENCOUNTER — TRANSCRIPTION ENCOUNTER (OUTPATIENT)
Age: 29
End: 2024-09-06

## 2025-01-16 ENCOUNTER — RX RENEWAL (OUTPATIENT)
Age: 30
End: 2025-01-16

## 2025-01-16 RX ORDER — NORETHINDRONE ACETATE AND ETHINYL ESTRADIOL AND FERROUS FUMARATE 1MG-20(21)
1-20 KIT ORAL
Qty: 28 | Refills: 11 | Status: ACTIVE | COMMUNITY
Start: 2025-01-16 | End: 1900-01-01

## 2025-03-06 ENCOUNTER — APPOINTMENT (OUTPATIENT)
Dept: OBGYN | Facility: CLINIC | Age: 30
End: 2025-03-06
Payer: COMMERCIAL

## 2025-03-06 VITALS
SYSTOLIC BLOOD PRESSURE: 123 MMHG | BODY MASS INDEX: 30.22 KG/M2 | DIASTOLIC BLOOD PRESSURE: 85 MMHG | OXYGEN SATURATION: 98 % | WEIGHT: 177 LBS | HEART RATE: 63 BPM | HEIGHT: 64 IN

## 2025-03-06 PROCEDURE — 99395 PREV VISIT EST AGE 18-39: CPT

## 2025-03-06 RX ORDER — CHOLESTYRAMINE 4 G/9G
4 POWDER, FOR SUSPENSION ORAL
Refills: 0 | Status: ACTIVE | COMMUNITY

## 2025-03-07 LAB
C TRACH RRNA SPEC QL NAA+PROBE: NOT DETECTED
HPV HIGH+LOW RISK DNA PNL CVX: NOT DETECTED
N GONORRHOEA RRNA SPEC QL NAA+PROBE: NOT DETECTED
SOURCE TP AMPLIFICATION: NORMAL

## 2025-03-11 LAB — CYTOLOGY CVX/VAG DOC THIN PREP: NORMAL

## 2025-06-16 ENCOUNTER — TRANSCRIPTION ENCOUNTER (OUTPATIENT)
Age: 30
End: 2025-06-16

## 2025-06-17 ENCOUNTER — TRANSCRIPTION ENCOUNTER (OUTPATIENT)
Age: 30
End: 2025-06-17

## 2025-07-07 ENCOUNTER — TRANSCRIPTION ENCOUNTER (OUTPATIENT)
Age: 30
End: 2025-07-07

## (undated) DEVICE — SUT MONOCRYL 4-0 18" PS-2

## (undated) DEVICE — DRAPE C ARM 41X74"

## (undated) DEVICE — SOL IRR BAG NS 0.9% 3000ML

## (undated) DEVICE — Device

## (undated) DEVICE — POSITIONER FOAM EGG CRATE ULNAR 2PCS (PINK)

## (undated) DEVICE — STOPCOCK 3 WAY

## (undated) DEVICE — ENDOCATCH 10MM SPECIMEN POUCH

## (undated) DEVICE — TROCAR COVIDIEN VERSAPORT BLADELESS OPTICAL 12MM STANDARD

## (undated) DEVICE — CATH ANGIOCATH 12G

## (undated) DEVICE — VENODYNE/SCD SLEEVE CALF MEDIUM

## (undated) DEVICE — SPONGE ENDO PEANUT 5MM

## (undated) DEVICE — PACK GENERAL LAPAROSCOPY

## (undated) DEVICE — TROCAR COVIDIEN VERSAONE FIXATION CANNULA 5MM

## (undated) DEVICE — TROCAR COVIDIEN VERSAPORT BLADELESS OPTICAL 5MM STANDARD

## (undated) DEVICE — WARMING BLANKET UPPER ADULT

## (undated) DEVICE — DRSG DERMABOND 0.7ML

## (undated) DEVICE — BLADE SURGICAL #15 CARBON

## (undated) DEVICE — TUBING STRYKER PNEUMOSURE HI FLOW INSUFFLATOR

## (undated) DEVICE — SYR LUER LOK 10CC

## (undated) DEVICE — GLV 7.5 PROTEXIS (WHITE)

## (undated) DEVICE — SUT VICRYL 0 27" UR-6

## (undated) DEVICE — TIP METZENBAUM SCISSOR MONOPOLAR ENDOCUT (ORANGE)